# Patient Record
Sex: MALE | Race: WHITE | NOT HISPANIC OR LATINO | Employment: OTHER | URBAN - METROPOLITAN AREA
[De-identification: names, ages, dates, MRNs, and addresses within clinical notes are randomized per-mention and may not be internally consistent; named-entity substitution may affect disease eponyms.]

---

## 2018-10-29 ENCOUNTER — HOSPITAL ENCOUNTER (INPATIENT)
Facility: MEDICAL CENTER | Age: 68
LOS: 3 days | DRG: 247 | End: 2018-11-01
Attending: EMERGENCY MEDICINE | Admitting: HOSPITALIST
Payer: MEDICARE

## 2018-10-29 ENCOUNTER — APPOINTMENT (OUTPATIENT)
Dept: RADIOLOGY | Facility: MEDICAL CENTER | Age: 68
DRG: 247 | End: 2018-10-29
Attending: EMERGENCY MEDICINE
Payer: MEDICARE

## 2018-10-29 DIAGNOSIS — R79.89 ELEVATED TROPONIN: ICD-10-CM

## 2018-10-29 DIAGNOSIS — I21.4 NSTEMI (NON-ST ELEVATED MYOCARDIAL INFARCTION) (HCC): ICD-10-CM

## 2018-10-29 PROBLEM — E78.5 HLD (HYPERLIPIDEMIA): Status: ACTIVE | Noted: 2018-10-29

## 2018-10-29 PROBLEM — I10 HTN (HYPERTENSION): Status: ACTIVE | Noted: 2018-10-29

## 2018-10-29 LAB
APTT PPP: 64.7 SEC (ref 24.7–36)
INR PPP: 1.18 (ref 0.87–1.13)
PROTHROMBIN TIME: 15.1 SEC (ref 12–14.6)
TROPONIN I SERPL-MCNC: 35.92 NG/ML (ref 0–0.04)

## 2018-10-29 PROCEDURE — 700111 HCHG RX REV CODE 636 W/ 250 OVERRIDE (IP): Performed by: EMERGENCY MEDICINE

## 2018-10-29 PROCEDURE — 96365 THER/PROPH/DIAG IV INF INIT: CPT

## 2018-10-29 PROCEDURE — 99358 PROLONG SERVICE W/O CONTACT: CPT | Performed by: HOSPITALIST

## 2018-10-29 PROCEDURE — 85730 THROMBOPLASTIN TIME PARTIAL: CPT

## 2018-10-29 PROCEDURE — 84484 ASSAY OF TROPONIN QUANT: CPT

## 2018-10-29 PROCEDURE — 99223 1ST HOSP IP/OBS HIGH 75: CPT | Performed by: INTERNAL MEDICINE

## 2018-10-29 PROCEDURE — 700102 HCHG RX REV CODE 250 W/ 637 OVERRIDE(OP)

## 2018-10-29 PROCEDURE — 700102 HCHG RX REV CODE 250 W/ 637 OVERRIDE(OP): Performed by: HOSPITALIST

## 2018-10-29 PROCEDURE — 93005 ELECTROCARDIOGRAM TRACING: CPT | Performed by: EMERGENCY MEDICINE

## 2018-10-29 PROCEDURE — 700101 HCHG RX REV CODE 250: Performed by: INTERNAL MEDICINE

## 2018-10-29 PROCEDURE — 93005 ELECTROCARDIOGRAM TRACING: CPT | Performed by: HOSPITALIST

## 2018-10-29 PROCEDURE — A9270 NON-COVERED ITEM OR SERVICE: HCPCS | Performed by: INTERNAL MEDICINE

## 2018-10-29 PROCEDURE — A9270 NON-COVERED ITEM OR SERVICE: HCPCS | Performed by: HOSPITALIST

## 2018-10-29 PROCEDURE — 36415 COLL VENOUS BLD VENIPUNCTURE: CPT

## 2018-10-29 PROCEDURE — 71046 X-RAY EXAM CHEST 2 VIEWS: CPT

## 2018-10-29 PROCEDURE — 99291 CRITICAL CARE FIRST HOUR: CPT

## 2018-10-29 PROCEDURE — 93010 ELECTROCARDIOGRAM REPORT: CPT | Performed by: INTERNAL MEDICINE

## 2018-10-29 PROCEDURE — 93005 ELECTROCARDIOGRAM TRACING: CPT

## 2018-10-29 PROCEDURE — 99291 CRITICAL CARE FIRST HOUR: CPT | Performed by: HOSPITALIST

## 2018-10-29 PROCEDURE — A9270 NON-COVERED ITEM OR SERVICE: HCPCS

## 2018-10-29 PROCEDURE — 85610 PROTHROMBIN TIME: CPT

## 2018-10-29 PROCEDURE — 770022 HCHG ROOM/CARE - ICU (200)

## 2018-10-29 PROCEDURE — 700102 HCHG RX REV CODE 250 W/ 637 OVERRIDE(OP): Performed by: INTERNAL MEDICINE

## 2018-10-29 RX ORDER — NITROGLYCERIN 0.4 MG/1
0.4 TABLET SUBLINGUAL
Status: DISCONTINUED | OUTPATIENT
Start: 2018-10-29 | End: 2018-11-01 | Stop reason: HOSPADM

## 2018-10-29 RX ORDER — NITROGLYCERIN 0.4 MG/1
TABLET SUBLINGUAL
Status: COMPLETED
Start: 2018-10-29 | End: 2018-10-29

## 2018-10-29 RX ORDER — SODIUM CHLORIDE AND POTASSIUM CHLORIDE 150; 900 MG/100ML; MG/100ML
INJECTION, SOLUTION INTRAVENOUS CONTINUOUS
Status: DISCONTINUED | OUTPATIENT
Start: 2018-10-29 | End: 2018-11-01 | Stop reason: HOSPADM

## 2018-10-29 RX ORDER — ATORVASTATIN CALCIUM 20 MG/1
20 TABLET, FILM COATED ORAL DAILY
Status: ON HOLD | COMMUNITY
End: 2018-11-01

## 2018-10-29 RX ORDER — LOSARTAN POTASSIUM 50 MG/1
50 TABLET ORAL DAILY
COMMUNITY
End: 2018-12-10 | Stop reason: SDUPTHER

## 2018-10-29 RX ORDER — METOPROLOL TARTRATE 1 MG/ML
5 INJECTION, SOLUTION INTRAVENOUS
Status: DISCONTINUED | OUTPATIENT
Start: 2018-10-29 | End: 2018-11-01 | Stop reason: HOSPADM

## 2018-10-29 RX ORDER — OMEPRAZOLE 20 MG/1
20 CAPSULE, DELAYED RELEASE ORAL 2 TIMES DAILY
COMMUNITY
End: 2020-09-08

## 2018-10-29 RX ORDER — MORPHINE SULFATE 4 MG/ML
2-4 INJECTION, SOLUTION INTRAMUSCULAR; INTRAVENOUS
Status: DISCONTINUED | OUTPATIENT
Start: 2018-10-29 | End: 2018-11-01 | Stop reason: HOSPADM

## 2018-10-29 RX ORDER — HEPARIN SODIUM 1000 [USP'U]/ML
3800 INJECTION, SOLUTION INTRAVENOUS; SUBCUTANEOUS PRN
Status: DISCONTINUED | OUTPATIENT
Start: 2018-10-29 | End: 2018-10-29

## 2018-10-29 RX ORDER — ATORVASTATIN CALCIUM 80 MG/1
80 TABLET, FILM COATED ORAL EVERY EVENING
Status: DISCONTINUED | OUTPATIENT
Start: 2018-10-29 | End: 2018-11-01 | Stop reason: HOSPADM

## 2018-10-29 RX ORDER — LORAZEPAM 1 MG/1
1 TABLET ORAL 2 TIMES DAILY
COMMUNITY

## 2018-10-29 RX ORDER — OMEPRAZOLE 20 MG/1
20 CAPSULE, DELAYED RELEASE ORAL 2 TIMES DAILY
Status: DISCONTINUED | OUTPATIENT
Start: 2018-10-29 | End: 2018-11-01 | Stop reason: HOSPADM

## 2018-10-29 RX ORDER — LORAZEPAM 1 MG/1
1 TABLET ORAL 2 TIMES DAILY
Status: DISCONTINUED | OUTPATIENT
Start: 2018-10-29 | End: 2018-11-01 | Stop reason: HOSPADM

## 2018-10-29 RX ORDER — POLYETHYLENE GLYCOL 3350 17 G/17G
1 POWDER, FOR SOLUTION ORAL
Status: DISCONTINUED | OUTPATIENT
Start: 2018-10-29 | End: 2018-11-01 | Stop reason: HOSPADM

## 2018-10-29 RX ORDER — LOSARTAN POTASSIUM 25 MG/1
50 TABLET ORAL DAILY
Status: DISCONTINUED | OUTPATIENT
Start: 2018-10-30 | End: 2018-11-01 | Stop reason: HOSPADM

## 2018-10-29 RX ORDER — BISACODYL 10 MG
10 SUPPOSITORY, RECTAL RECTAL
Status: DISCONTINUED | OUTPATIENT
Start: 2018-10-29 | End: 2018-11-01 | Stop reason: HOSPADM

## 2018-10-29 RX ORDER — AMOXICILLIN 250 MG
2 CAPSULE ORAL 2 TIMES DAILY
Status: DISCONTINUED | OUTPATIENT
Start: 2018-10-29 | End: 2018-11-01 | Stop reason: HOSPADM

## 2018-10-29 RX ORDER — IBUPROFEN 200 MG
400 TABLET ORAL EVERY 6 HOURS PRN
Status: ON HOLD | COMMUNITY
End: 2018-11-01

## 2018-10-29 RX ORDER — ATORVASTATIN CALCIUM 20 MG/1
80 TABLET, FILM COATED ORAL DAILY
Status: DISCONTINUED | OUTPATIENT
Start: 2018-10-30 | End: 2018-10-29

## 2018-10-29 RX ORDER — DIPHENHYDRAMINE HCL 25 MG
50 TABLET ORAL
COMMUNITY
End: 2020-06-05

## 2018-10-29 RX ORDER — HEPARIN SODIUM 1000 [USP'U]/ML
3800 INJECTION, SOLUTION INTRAVENOUS; SUBCUTANEOUS PRN
Status: DISCONTINUED | OUTPATIENT
Start: 2018-10-29 | End: 2018-10-30

## 2018-10-29 RX ORDER — ACETAMINOPHEN 500 MG
2000 TABLET ORAL
COMMUNITY

## 2018-10-29 RX ADMIN — ATORVASTATIN CALCIUM 80 MG: 80 TABLET, FILM COATED ORAL at 22:55

## 2018-10-29 RX ADMIN — NITROGLYCERIN 0.4 MG: 0.4 TABLET SUBLINGUAL at 21:53

## 2018-10-29 RX ADMIN — NITROGLYCERIN 0.5 INCH: 20 OINTMENT TOPICAL at 22:55

## 2018-10-29 RX ADMIN — OMEPRAZOLE 20 MG: 20 CAPSULE, DELAYED RELEASE ORAL at 22:55

## 2018-10-29 RX ADMIN — HEPARIN SODIUM 1450 UNITS/HR: 5000 INJECTION, SOLUTION INTRAVENOUS at 20:38

## 2018-10-29 RX ADMIN — LORAZEPAM 1 MG: 1 TABLET ORAL at 22:56

## 2018-10-29 RX ADMIN — POTASSIUM CHLORIDE AND SODIUM CHLORIDE: 900; 150 INJECTION, SOLUTION INTRAVENOUS at 23:02

## 2018-10-29 ASSESSMENT — COGNITIVE AND FUNCTIONAL STATUS - GENERAL
SUGGESTED CMS G CODE MODIFIER DAILY ACTIVITY: CH
DAILY ACTIVITIY SCORE: 24
MOBILITY SCORE: 24
SUGGESTED CMS G CODE MODIFIER MOBILITY: CH

## 2018-10-29 ASSESSMENT — PATIENT HEALTH QUESTIONNAIRE - PHQ9
2. FEELING DOWN, DEPRESSED, IRRITABLE, OR HOPELESS: NOT AT ALL
SUM OF ALL RESPONSES TO PHQ9 QUESTIONS 1 AND 2: 0
1. LITTLE INTEREST OR PLEASURE IN DOING THINGS: NOT AT ALL

## 2018-10-29 ASSESSMENT — PAIN SCALES - GENERAL
PAINLEVEL_OUTOF10: 0
PAINLEVEL_OUTOF10: 2
PAINLEVEL_OUTOF10: 2
PAINLEVEL_OUTOF10: 0
PAINLEVEL_OUTOF10: 0

## 2018-10-29 ASSESSMENT — ENCOUNTER SYMPTOMS
NECK PAIN: 0
BACK PAIN: 0

## 2018-10-29 ASSESSMENT — LIFESTYLE VARIABLES
EVER_SMOKED: YES
ALCOHOL_USE: NO

## 2018-10-30 ENCOUNTER — APPOINTMENT (OUTPATIENT)
Dept: CARDIOLOGY | Facility: MEDICAL CENTER | Age: 68
DRG: 247 | End: 2018-10-30
Attending: HOSPITALIST
Payer: MEDICARE

## 2018-10-30 ENCOUNTER — PATIENT OUTREACH (OUTPATIENT)
Dept: HEALTH INFORMATION MANAGEMENT | Facility: OTHER | Age: 68
End: 2018-10-30

## 2018-10-30 LAB
ANION GAP SERPL CALC-SCNC: 7 MMOL/L (ref 0–11.9)
APTT PPP: 55.1 SEC (ref 24.7–36)
APTT PPP: 75.4 SEC (ref 24.7–36)
BASOPHILS # BLD AUTO: 0.2 % (ref 0–1.8)
BASOPHILS # BLD: 0.03 K/UL (ref 0–0.12)
BUN SERPL-MCNC: 16 MG/DL (ref 8–22)
CALCIUM SERPL-MCNC: 8.5 MG/DL (ref 8.5–10.5)
CHLORIDE SERPL-SCNC: 103 MMOL/L (ref 96–112)
CO2 SERPL-SCNC: 26 MMOL/L (ref 20–33)
CREAT SERPL-MCNC: 1.05 MG/DL (ref 0.5–1.4)
EKG IMPRESSION: NORMAL
EKG IMPRESSION: NORMAL
EOSINOPHIL # BLD AUTO: 0.03 K/UL (ref 0–0.51)
EOSINOPHIL NFR BLD: 0.2 % (ref 0–6.9)
ERYTHROCYTE [DISTWIDTH] IN BLOOD BY AUTOMATED COUNT: 47.3 FL (ref 35.9–50)
GLUCOSE SERPL-MCNC: 151 MG/DL (ref 65–99)
HCT VFR BLD AUTO: 42.3 % (ref 42–52)
HGB BLD-MCNC: 14.1 G/DL (ref 14–18)
IMM GRANULOCYTES # BLD AUTO: 0.07 K/UL (ref 0–0.11)
IMM GRANULOCYTES NFR BLD AUTO: 0.5 % (ref 0–0.9)
INR PPP: 2 (ref 0.87–1.13)
LV EJECT FRACT  99904: 45
LV EJECT FRACT MOD 4C 99902: 36.61
LYMPHOCYTES # BLD AUTO: 1.75 K/UL (ref 1–4.8)
LYMPHOCYTES NFR BLD: 12.3 % (ref 22–41)
MCH RBC QN AUTO: 31.5 PG (ref 27–33)
MCHC RBC AUTO-ENTMCNC: 33.3 G/DL (ref 33.7–35.3)
MCV RBC AUTO: 94.6 FL (ref 81.4–97.8)
MONOCYTES # BLD AUTO: 1.36 K/UL (ref 0–0.85)
MONOCYTES NFR BLD AUTO: 9.6 % (ref 0–13.4)
NEUTROPHILS # BLD AUTO: 11 K/UL (ref 1.82–7.42)
NEUTROPHILS NFR BLD: 77.2 % (ref 44–72)
NRBC # BLD AUTO: 0 K/UL
NRBC BLD-RTO: 0 /100 WBC
PLATELET # BLD AUTO: 183 K/UL (ref 164–446)
PMV BLD AUTO: 11.3 FL (ref 9–12.9)
POTASSIUM SERPL-SCNC: 4 MMOL/L (ref 3.6–5.5)
PROTHROMBIN TIME: 22.7 SEC (ref 12–14.6)
RBC # BLD AUTO: 4.47 M/UL (ref 4.7–6.1)
SODIUM SERPL-SCNC: 136 MMOL/L (ref 135–145)
TROPONIN I SERPL-MCNC: 31.14 NG/ML (ref 0–0.04)
TROPONIN I SERPL-MCNC: 33.77 NG/ML (ref 0–0.04)
TROPONIN I SERPL-MCNC: 38.42 NG/ML (ref 0–0.04)
TROPONIN I SERPL-MCNC: 43.14 NG/ML (ref 0–0.04)
TROPONIN I SERPL-MCNC: 50.98 NG/ML (ref 0–0.04)
WBC # BLD AUTO: 14.2 K/UL (ref 4.8–10.8)

## 2018-10-30 PROCEDURE — C9600 PERC DRUG-EL COR STENT SING: HCPCS | Mod: LD

## 2018-10-30 PROCEDURE — C1894 INTRO/SHEATH, NON-LASER: HCPCS

## 2018-10-30 PROCEDURE — B2151ZZ FLUOROSCOPY OF LEFT HEART USING LOW OSMOLAR CONTRAST: ICD-10-PCS | Performed by: INTERNAL MEDICINE

## 2018-10-30 PROCEDURE — C1769 GUIDE WIRE: HCPCS

## 2018-10-30 PROCEDURE — 92928 PRQ TCAT PLMT NTRAC ST 1 LES: CPT | Mod: 59,LD | Performed by: INTERNAL MEDICINE

## 2018-10-30 PROCEDURE — C1725 CATH, TRANSLUMIN NON-LASER: HCPCS

## 2018-10-30 PROCEDURE — 85610 PROTHROMBIN TIME: CPT

## 2018-10-30 PROCEDURE — 770022 HCHG ROOM/CARE - ICU (200)

## 2018-10-30 PROCEDURE — 93010 ELECTROCARDIOGRAM REPORT: CPT | Performed by: INTERNAL MEDICINE

## 2018-10-30 PROCEDURE — 700111 HCHG RX REV CODE 636 W/ 250 OVERRIDE (IP): Performed by: HOSPITALIST

## 2018-10-30 PROCEDURE — 51798 US URINE CAPACITY MEASURE: CPT

## 2018-10-30 PROCEDURE — 360979 HCHG DIAGNOSTIC CATH

## 2018-10-30 PROCEDURE — 700105 HCHG RX REV CODE 258: Performed by: INTERNAL MEDICINE

## 2018-10-30 PROCEDURE — 84484 ASSAY OF TROPONIN QUANT: CPT | Mod: 91

## 2018-10-30 PROCEDURE — 93325 DOPPLER ECHO COLOR FLOW MAPG: CPT

## 2018-10-30 PROCEDURE — 90471 IMMUNIZATION ADMIN: CPT

## 2018-10-30 PROCEDURE — 99232 SBSQ HOSP IP/OBS MODERATE 35: CPT | Performed by: HOSPITALIST

## 2018-10-30 PROCEDURE — B2111ZZ FLUOROSCOPY OF MULTIPLE CORONARY ARTERIES USING LOW OSMOLAR CONTRAST: ICD-10-PCS | Performed by: INTERNAL MEDICINE

## 2018-10-30 PROCEDURE — 99152 MOD SED SAME PHYS/QHP 5/>YRS: CPT

## 2018-10-30 PROCEDURE — 93005 ELECTROCARDIOGRAM TRACING: CPT | Performed by: INTERNAL MEDICINE

## 2018-10-30 PROCEDURE — 93458 L HRT ARTERY/VENTRICLE ANGIO: CPT | Mod: 26,59 | Performed by: INTERNAL MEDICINE

## 2018-10-30 PROCEDURE — C1874 STENT, COATED/COV W/DEL SYS: HCPCS

## 2018-10-30 PROCEDURE — 700102 HCHG RX REV CODE 250 W/ 637 OVERRIDE(OP)

## 2018-10-30 PROCEDURE — 93308 TTE F-UP OR LMTD: CPT | Mod: 26 | Performed by: INTERNAL MEDICINE

## 2018-10-30 PROCEDURE — C9600 PERC DRUG-EL COR STENT SING: HCPCS | Mod: RC

## 2018-10-30 PROCEDURE — A9270 NON-COVERED ITEM OR SERVICE: HCPCS | Performed by: INTERNAL MEDICINE

## 2018-10-30 PROCEDURE — 700102 HCHG RX REV CODE 250 W/ 637 OVERRIDE(OP): Performed by: INTERNAL MEDICINE

## 2018-10-30 PROCEDURE — 99232 SBSQ HOSP IP/OBS MODERATE 35: CPT | Performed by: INTERNAL MEDICINE

## 2018-10-30 PROCEDURE — 700111 HCHG RX REV CODE 636 W/ 250 OVERRIDE (IP)

## 2018-10-30 PROCEDURE — 700101 HCHG RX REV CODE 250

## 2018-10-30 PROCEDURE — 99152 MOD SED SAME PHYS/QHP 5/>YRS: CPT | Performed by: INTERNAL MEDICINE

## 2018-10-30 PROCEDURE — 99153 MOD SED SAME PHYS/QHP EA: CPT

## 2018-10-30 PROCEDURE — 307093 HCHG TR BAND RADIAL

## 2018-10-30 PROCEDURE — 93458 L HRT ARTERY/VENTRICLE ANGIO: CPT

## 2018-10-30 PROCEDURE — 85730 THROMBOPLASTIN TIME PARTIAL: CPT | Mod: 91

## 2018-10-30 PROCEDURE — 700101 HCHG RX REV CODE 250: Performed by: INTERNAL MEDICINE

## 2018-10-30 PROCEDURE — 80048 BASIC METABOLIC PNL TOTAL CA: CPT

## 2018-10-30 PROCEDURE — 700102 HCHG RX REV CODE 250 W/ 637 OVERRIDE(OP): Performed by: HOSPITALIST

## 2018-10-30 PROCEDURE — A9270 NON-COVERED ITEM OR SERVICE: HCPCS

## 2018-10-30 PROCEDURE — C1887 CATHETER, GUIDING: HCPCS

## 2018-10-30 PROCEDURE — A9270 NON-COVERED ITEM OR SERVICE: HCPCS | Performed by: HOSPITALIST

## 2018-10-30 PROCEDURE — 700111 HCHG RX REV CODE 636 W/ 250 OVERRIDE (IP): Performed by: INTERNAL MEDICINE

## 2018-10-30 PROCEDURE — 304952 HCHG R 2 PADS

## 2018-10-30 PROCEDURE — 3E0234Z INTRODUCTION OF SERUM, TOXOID AND VACCINE INTO MUSCLE, PERCUTANEOUS APPROACH: ICD-10-PCS | Performed by: INTERNAL MEDICINE

## 2018-10-30 PROCEDURE — 85025 COMPLETE CBC W/AUTO DIFF WBC: CPT

## 2018-10-30 PROCEDURE — 4A023N7 MEASUREMENT OF CARDIAC SAMPLING AND PRESSURE, LEFT HEART, PERCUTANEOUS APPROACH: ICD-10-PCS | Performed by: INTERNAL MEDICINE

## 2018-10-30 PROCEDURE — 027135Z DILATION OF CORONARY ARTERY, TWO ARTERIES WITH TWO DRUG-ELUTING INTRALUMINAL DEVICES, PERCUTANEOUS APPROACH: ICD-10-PCS | Performed by: INTERNAL MEDICINE

## 2018-10-30 PROCEDURE — 90670 PCV13 VACCINE IM: CPT | Performed by: HOSPITALIST

## 2018-10-30 RX ORDER — SODIUM CHLORIDE 9 MG/ML
INJECTION, SOLUTION INTRAVENOUS
Status: ACTIVE
Start: 2018-10-30 | End: 2018-10-30

## 2018-10-30 RX ORDER — LIDOCAINE HYDROCHLORIDE 20 MG/ML
INJECTION, SOLUTION INFILTRATION; PERINEURAL
Status: COMPLETED
Start: 2018-10-30 | End: 2018-10-30

## 2018-10-30 RX ORDER — VERAPAMIL HYDROCHLORIDE 2.5 MG/ML
INJECTION, SOLUTION INTRAVENOUS
Status: COMPLETED
Start: 2018-10-30 | End: 2018-10-30

## 2018-10-30 RX ORDER — MIDAZOLAM HYDROCHLORIDE 1 MG/ML
INJECTION INTRAMUSCULAR; INTRAVENOUS
Status: COMPLETED
Start: 2018-10-30 | End: 2018-10-30

## 2018-10-30 RX ORDER — BIVALIRUDIN 250 MG/5ML
INJECTION, POWDER, LYOPHILIZED, FOR SOLUTION INTRAVENOUS
Status: COMPLETED
Start: 2018-10-30 | End: 2018-10-30

## 2018-10-30 RX ORDER — HEPARIN SODIUM,PORCINE 1000/ML
VIAL (ML) INJECTION
Status: COMPLETED
Start: 2018-10-30 | End: 2018-10-30

## 2018-10-30 RX ORDER — SODIUM CHLORIDE 9 MG/ML
INJECTION, SOLUTION INTRAVENOUS CONTINUOUS
Status: ACTIVE | OUTPATIENT
Start: 2018-10-30 | End: 2018-10-30

## 2018-10-30 RX ORDER — ONDANSETRON 2 MG/ML
4 INJECTION INTRAMUSCULAR; INTRAVENOUS EVERY 4 HOURS PRN
Status: DISCONTINUED | OUTPATIENT
Start: 2018-10-30 | End: 2018-11-01 | Stop reason: HOSPADM

## 2018-10-30 RX ORDER — ACETAMINOPHEN 325 MG/1
650 TABLET ORAL EVERY 6 HOURS PRN
Status: DISCONTINUED | OUTPATIENT
Start: 2018-10-30 | End: 2018-11-01 | Stop reason: HOSPADM

## 2018-10-30 RX ADMIN — MORPHINE SULFATE 4 MG: 4 INJECTION INTRAVENOUS at 00:01

## 2018-10-30 RX ADMIN — HEPARIN SODIUM: 1000 INJECTION, SOLUTION INTRAVENOUS; SUBCUTANEOUS at 08:09

## 2018-10-30 RX ADMIN — POTASSIUM CHLORIDE AND SODIUM CHLORIDE: 900; 150 INJECTION, SOLUTION INTRAVENOUS at 17:21

## 2018-10-30 RX ADMIN — NITROGLYCERIN 0.5 INCH: 20 OINTMENT TOPICAL at 18:00

## 2018-10-30 RX ADMIN — MIDAZOLAM HYDROCHLORIDE 1.5 MG: 1 INJECTION, SOLUTION INTRAMUSCULAR; INTRAVENOUS at 09:14

## 2018-10-30 RX ADMIN — NITROGLYCERIN 0.5 INCH: 20 OINTMENT TOPICAL at 12:04

## 2018-10-30 RX ADMIN — OMEPRAZOLE 20 MG: 20 CAPSULE, DELAYED RELEASE ORAL at 20:47

## 2018-10-30 RX ADMIN — BIVALIRUDIN 250 MG: 250 INJECTION, POWDER, LYOPHILIZED, FOR SOLUTION INTRAVENOUS at 09:23

## 2018-10-30 RX ADMIN — ONDANSETRON 4 MG: 2 INJECTION, SOLUTION INTRAMUSCULAR; INTRAVENOUS at 13:28

## 2018-10-30 RX ADMIN — VERAPAMIL HYDROCHLORIDE 2.5 MG: 2.5 INJECTION, SOLUTION INTRAVENOUS at 08:10

## 2018-10-30 RX ADMIN — NITROGLYCERIN 10 ML: 20 INJECTION INTRAVENOUS at 08:10

## 2018-10-30 RX ADMIN — Medication 2 TABLET: at 18:00

## 2018-10-30 RX ADMIN — TICAGRELOR 180 MG: 90 TABLET ORAL at 09:24

## 2018-10-30 RX ADMIN — OMEPRAZOLE 20 MG: 20 CAPSULE, DELAYED RELEASE ORAL at 10:06

## 2018-10-30 RX ADMIN — NITROGLYCERIN 0.5 INCH: 20 OINTMENT TOPICAL at 05:28

## 2018-10-30 RX ADMIN — LORAZEPAM 1 MG: 1 TABLET ORAL at 18:00

## 2018-10-30 RX ADMIN — ASPIRIN 81 MG: 81 TABLET, COATED ORAL at 05:28

## 2018-10-30 RX ADMIN — PNEUMOCOCCAL 13-VALENT CONJUGATE VACCINE 0.5 ML: 2.2; 2.2; 2.2; 2.2; 2.2; 4.4; 2.2; 2.2; 2.2; 2.2; 2.2; 2.2; 2.2 INJECTION, SUSPENSION INTRAMUSCULAR at 05:28

## 2018-10-30 RX ADMIN — FENTANYL CITRATE 75 MCG: 50 INJECTION, SOLUTION INTRAMUSCULAR; INTRAVENOUS at 09:14

## 2018-10-30 RX ADMIN — ACETAMINOPHEN 650 MG: 325 TABLET, FILM COATED ORAL at 18:12

## 2018-10-30 RX ADMIN — SODIUM CHLORIDE: 9 INJECTION, SOLUTION INTRAVENOUS at 10:07

## 2018-10-30 RX ADMIN — LORAZEPAM 1 MG: 1 TABLET ORAL at 05:28

## 2018-10-30 RX ADMIN — LIDOCAINE HYDROCHLORIDE: 20 INJECTION, SOLUTION INFILTRATION; PERINEURAL at 08:10

## 2018-10-30 RX ADMIN — ATORVASTATIN CALCIUM 80 MG: 80 TABLET, FILM COATED ORAL at 18:00

## 2018-10-30 RX ADMIN — BIVALIRUDIN 250 MG: 250 INJECTION, POWDER, LYOPHILIZED, FOR SOLUTION INTRAVENOUS at 08:25

## 2018-10-30 RX ADMIN — BIVALIRUDIN 0.2 MG/KG/HR: 250 INJECTION, POWDER, LYOPHILIZED, FOR SOLUTION INTRAVENOUS at 10:30

## 2018-10-30 RX ADMIN — POTASSIUM CHLORIDE AND SODIUM CHLORIDE: 900; 150 INJECTION, SOLUTION INTRAVENOUS at 05:27

## 2018-10-30 RX ADMIN — HEPARIN SODIUM 2000 UNITS: 1000 INJECTION, SOLUTION INTRAVENOUS; SUBCUTANEOUS at 08:11

## 2018-10-30 ASSESSMENT — ENCOUNTER SYMPTOMS
LOSS OF CONSCIOUSNESS: 0
DIARRHEA: 0
NECK PAIN: 0
DIAPHORESIS: 1
NAUSEA: 1
CHILLS: 0
STRIDOR: 0
WEAKNESS: 0
BLOOD IN STOOL: 0
NERVOUS/ANXIOUS: 0
ORTHOPNEA: 0
TINGLING: 0
COUGH: 0
WHEEZING: 0
SEIZURES: 0
SHORTNESS OF BREATH: 0
FLANK PAIN: 0
SPEECH CHANGE: 0
SORE THROAT: 0
BACK PAIN: 0
FALLS: 0
DIZZINESS: 0
EYE DISCHARGE: 0
HEADACHES: 0
ABDOMINAL PAIN: 0
PHOTOPHOBIA: 0
MEMORY LOSS: 0
BLURRED VISION: 0
VOMITING: 0
DEPRESSION: 0
EYE REDNESS: 0
SPUTUM PRODUCTION: 0
EYE PAIN: 0
PALPITATIONS: 0
HEMOPTYSIS: 0
NAUSEA: 0
FOCAL WEAKNESS: 0
MYALGIAS: 0
FEVER: 0
HALLUCINATIONS: 0

## 2018-10-30 ASSESSMENT — PAIN SCALES - GENERAL
PAINLEVEL_OUTOF10: 0
PAINLEVEL_OUTOF10: 3
PAINLEVEL_OUTOF10: 2
PAINLEVEL_OUTOF10: 0

## 2018-10-30 ASSESSMENT — LIFESTYLE VARIABLES
SUBSTANCE_ABUSE: 0
EVER_SMOKED: NEVER

## 2018-10-30 NOTE — PROGRESS NOTES
[facility] Hospitalist Progress Note    Date of Service: 10/30/2018    Chief Complaint  68 y.o. male admitted 10/29/2018 with NSTEMI    Interval Problem Update  On angiomax  NS  Straight cath overnight x1     Consultants/Specialty  Cardiology  CC    Disposition  TBD        Review of Systems   Constitutional: Negative for fever and malaise/fatigue.   HENT: Negative for congestion, ear discharge and nosebleeds.    Eyes: Negative for blurred vision, pain, discharge and redness.   Respiratory: Negative for cough, hemoptysis, sputum production, shortness of breath and stridor.    Cardiovascular: Negative for chest pain, palpitations, orthopnea and leg swelling.   Gastrointestinal: Negative for abdominal pain, blood in stool, diarrhea, melena, nausea and vomiting.   Genitourinary: Negative for dysuria, flank pain and hematuria.   Musculoskeletal: Negative for back pain, falls, joint pain and neck pain.   Skin: Negative.    Neurological: Negative for speech change, focal weakness, seizures, loss of consciousness, weakness and headaches.   Psychiatric/Behavioral: Negative for hallucinations, memory loss and substance abuse. The patient is not nervous/anxious.    All other systems reviewed and are negative.     Physical Exam  Laboratory/Imaging   Hemodynamics  Temp (24hrs), Av.7 °C (98.1 °F), Min:36.2 °C (97.2 °F), Max:37.1 °C (98.7 °F)   Temperature: 36.2 °C (97.2 °F)  Pulse  Av.2  Min: 56  Max: 84 Heart Rate (Monitored): (!) 58  Blood Pressure : 109/65, NIBP: 102/61      Respiratory      Respiration: (!) 21, Pulse Oximetry: 100 %             Fluids    Intake/Output Summary (Last 24 hours) at 10/30/18 1042  Last data filed at 10/30/18 1000   Gross per 24 hour   Intake          2066.04 ml   Output              350 ml   Net          1716.04 ml       Nutrition  Orders Placed This Encounter   Procedures   • Diet Order Cardiac     Standing Status:   Standing     Number of Occurrences:   1     Order Specific Question:    Diet:     Answer:   Cardiac [6]     Physical Exam   Constitutional: He is oriented to person, place, and time. He appears well-developed and well-nourished.   HENT:   Head: Normocephalic and atraumatic.   Right Ear: External ear normal.   Left Ear: External ear normal.   Mouth/Throat: No oropharyngeal exudate.   Eyes: Pupils are equal, round, and reactive to light. Conjunctivae are normal. Right eye exhibits no discharge. Left eye exhibits no discharge.   Neck: Neck supple. No JVD present. No tracheal deviation present.   Cardiovascular: Normal rate and regular rhythm.  Exam reveals no gallop and no friction rub.    No murmur heard.  Pulmonary/Chest: Effort normal and breath sounds normal. No respiratory distress. He has no wheezes. He exhibits no tenderness.   Abdominal: Soft. Bowel sounds are normal. He exhibits no distension. There is no tenderness. There is no rebound.   Musculoskeletal: He exhibits no edema or tenderness.   Neurological: He is oriented to person, place, and time. No cranial nerve deficit. He exhibits normal muscle tone.   Asleep arousable   Skin: Skin is warm and dry. He is not diaphoretic. No cyanosis or erythema. Nails show no clubbing.   Psychiatric: He has a normal mood and affect.   Nursing note and vitals reviewed.      Recent Labs      10/30/18   0527   WBC  14.2*   RBC  4.47*   HEMOGLOBIN  14.1   HEMATOCRIT  42.3   MCV  94.6   MCH  31.5   MCHC  33.3*   RDW  47.3   PLATELETCT  183   MPV  11.3     Recent Labs      10/30/18   0527   SODIUM  136   POTASSIUM  4.0   CHLORIDE  103   CO2  26   GLUCOSE  151*   BUN  16   CREATININE  1.05   CALCIUM  8.5     Recent Labs      10/29/18   1923  10/30/18   0249   APTT  64.7*  55.1*   INR  1.18*   --                   Assessment/Plan     * NSTEMI (non-ST elevated myocardial infarction) ([facility])   Assessment & Plan    S/p PCI to mid RCA and mid LAD    Continue aspirin Brilinta atorvastatin  Continue losartan  Given bradycardia hold off on  beta-blocker  Cardiology following  Continue telemetry monitoring  Echo EF 45%        HLD (hyperlipidemia)   Assessment & Plan    High-dose atorvastatin        HTN (hypertension)   Assessment & Plan    Stable on losartan          Quality-Core Measures      Plan of care reviewed with patient and family at bedside and discussed with nursing staff

## 2018-10-30 NOTE — H&P
Hospital Medicine History & Physical Note    Date of Service  10/29/2018    Primary Care Physician  No primary care provider on file.    Consultants  Dr. Lion, cardiology    Code Status  Full    Chief Complaint  Chief Complaint   Patient presents with   • Chest Pain     x 3 days        History of Presenting Illness  68 y.o. male who presented on 10/29/2018 in transfer from outside facility for NSTEMI.  The patient states that his symptoms began 3 days ago.  At that time, he had been unloading a truck full of lawnmowers and initially thought that he had developed a muscle strain.  Since then, he has had a dull, centralized chest pain which radiates to his ears and right arm and is associated with dizziness as well as diaphoresis.  He also had difficulty sleeping.  Today, as his pain persisted, he called his family physician who referred him to the emergency room.  His family history is positive for CVA in his mother and paternal grandfather in his 50s.  He otherwise has had no recent fevers, chills, nausea, vomiting, headache, URIs, diarrhea or dysuria.    At the outside facility, workup including WBC 15.6, hemoglobin 16.6 hematocrit 48.7 platelets 231 sodium 137 potassium 40 chloride 99 CO2 26 BUN 13 creatinine 0.8 glucose 124 troponin 15.9.  Chest x-ray showed mild cardiomegaly otherwise unchanged since to the he was transferred to our facility for higher level of care and specialist consultation.    Review of Systems  Review of Systems   Constitutional: Negative for chills and fever.   HENT: Negative for congestion and sore throat.    Eyes: Negative for photophobia.   Respiratory: Negative for cough, shortness of breath and wheezing.    Cardiovascular: Negative for chest pain and palpitations.   Gastrointestinal: Negative for abdominal pain, diarrhea, nausea and vomiting.   Genitourinary: Negative for dysuria.   Musculoskeletal: Negative for myalgias.   Skin: Negative.    Neurological: Negative for  dizziness, tingling, focal weakness and headaches.   Psychiatric/Behavioral: Negative for depression and suicidal ideas.       Past Medical History  Past Medical History:   Diagnosis Date   • GERD (gastroesophageal reflux disease)    • Hyperlipidemia    • Hypertension    • Hypothyroidism        Surgical History  Patient denies    Family History  CVAs in mother and paternal grandfather    Social History  Social History   Substance Use Topics   • Smoking status: Former Smoker   • Smokeless tobacco: Not on file   • Alcohol use No       Allergies  No Known Allergies    Medications  No current facility-administered medications on file prior to encounter.      No current outpatient prescriptions on file prior to encounter.       Physical Exam  Hemodynamics  Temp (24hrs), Av.5 °C (97.7 °F), Min:36.5 °C (97.7 °F), Max:36.5 °C (97.7 °F)   Temperature: 36.5 °C (97.7 °F)  Pulse  Av.6  Min: 59  Max: 81 Heart Rate (Monitored): 64  Blood Pressure : 109/65, NIBP: 105/50      Respiratory      Respiration: 18, Pulse Oximetry: 97 %             Physical Exam   Constitutional: He is oriented to person, place, and time. No distress.   HENT:   Head: Normocephalic and atraumatic.   Right Ear: External ear normal.   Left Ear: External ear normal.   Eyes: EOM are normal. Right eye exhibits no discharge. Left eye exhibits no discharge.   Neck: Neck supple. No JVD present.   Cardiovascular: Normal rate, regular rhythm and normal heart sounds.    Pulmonary/Chest: Effort normal and breath sounds normal. No respiratory distress. He exhibits no tenderness.   Abdominal: Soft. Bowel sounds are normal. He exhibits no distension. There is no tenderness.   Musculoskeletal: He exhibits no edema.   Neurological: He is alert and oriented to person, place, and time. No cranial nerve deficit.   Skin: Skin is dry. He is not diaphoretic. No erythema.   Psychiatric: He has a normal mood and affect. His behavior is normal.   Nursing note and vitals  reviewed.    Capillary refill less than 3 seconds, distal pulses intact    Laboratory:          No results for input(s): ALTSGPT, ASTSGOT, ALKPHOSPHAT, TBILIRUBIN, DBILIRUBIN, GAMMAGT, AMYLASE, LIPASE, ALB, PREALBUMIN, GLUCOSE in the last 72 hours.  Recent Labs      10/29/18   1923   APTT  64.7*   INR  1.18*             Lab Results   Component Value Date    TROPONINI 35.92 (H) 10/29/2018       Imaging  Dx-chest-2 Views    Result Date: 10/29/2018  10/29/2018 7:58 PM HISTORY/REASON FOR EXAM:  Chest Pain Chest Pain x3 days. Pt reports Saturday pain radiates across back of shoulders after lifting his . Pt reports felt that he had a muscle strain. Pain radiating down arms TECHNIQUE/EXAM DESCRIPTION AND NUMBER OF VIEWS: Two views of the chest. COMPARISON:  None. FINDINGS: Minimal bibasilar opacities. No pleural effusions, no pneumothorax are appreciated. Normal cardiopericardial silhouette.     Minimal bibasilar opacities, likely atelectasis.        Assessment/Plan:  Anticipate that patient will need greater than 2 midnights for management of the discussed medical issues.    * NSTEMI (non-ST elevated myocardial infarction) (HCC)   Assessment & Plan    Patient is currently chest pain-free.  However, he does have evolving troponin levels therefore he will be admitted to the cardiac intensive care unit for continuous hemodynamic monitoring.  I have started him on an heparin drip, I will also increase his home statin to higher intensity and continue his home aspirin.  He will receive as needed morphine, oxygen, and nitroglycerin.  I will check a TSH and a lipid panel as well as an echocardiogram.  If his blood pressure tolerates, we will consider adding a beta-blocker to his ARB.  He will be kept n.p.o. at midnight for cardiac catheterization in the morning. Dr. Lion of cardiology was consulted by the emergency room physician and I appreciate his recommendations.        HLD (hyperlipidemia)   Assessment & Plan     Treatment as noted above for NSTEMI, increasing home Lipitor dose to higher intensity and continuing aspirin        HTN (hypertension)   Assessment & Plan    Currently well controlled, continue home Cozaar.            Prophylaxis: Patient will be on heparin, no additional need for DVT prophylaxis, home PPI indicated, bowel protocol as needed    I spent a total of 35 minutes of critical care time during this clinical encounter of which > 50% was devoted to counseling where able and coordinating care including review of records, pertinent lab data and studies, as well as discussing diagnostic evaluation and work up, planned therapeutic interventions and future disposition of care. Where indicated, the assessment and plan reflect discussion of patient with consultants, other healthcare providers, family members, and additional research needed to obtain further information in formulating the plan of care of this patient. This time includes no procedures or overlap with other providers.    I spent a total of 35 minutes of non face to face time performing additional research, reviewing medical records from transferring facility, discussing plan of care with other healthcare providers. Start time: 7:55PM. End time: 8:30PM.

## 2018-10-30 NOTE — ED NOTES
Med Rec complete per Pt at bedside and Medication list (returned)   Allergies Reviewed  No ABX in the last 30 days    Pt reports taking 2000 mg of Tylenol at bedtime when needed.

## 2018-10-30 NOTE — ED PROVIDER NOTES
ED Provider Note    Scribed for Venkata Kurtz II, M.D. by Dede Marie. 10/29/2018  7:15 PM    Means of Arrival: Ambulance  History obtained by: Patient  Limitations: None    CHIEF COMPLAINT  Chief Complaint   Patient presents with   • Chest Pain     x 3 days        HPI   Lv Mast is a 68 y.o. male with history of hypertension who presents to the Emergency Department as a transfer from [place] for evaluation of intermittent chest pain that radiates to his bilateral shoulders onset 3 days ago. He reports the pain began after lifting a  and states it feels like he pulled a muscle. Lv additionally endorses associated diaphoresis and lightheadedness. He states Advil or low dose Aspirin the past few days has provided no alleviation. Lv discussed his symptoms with his PCP and was recommended to go to the ED today. Upon evaluation at the [place] ED, he was found to have an elevated troponin of 15. He was placed on a Heparin drip and Nitroglycerin drip, which has helped relieve his pain. He also received a full strength aspirin. Lv reports he does not currently have any cardiac stents in place. He denies any neck pain, back pain, or lower extremity edema at this time. Lv was transferred here for a higher level of care.         REVIEW OF SYSTEMS  Review of Systems   Constitutional: Positive for diaphoresis. Negative for fever.   HENT: Negative for congestion and sore throat.    Respiratory: Negative for cough, shortness of breath and wheezing.    Cardiovascular: Positive for chest pain. Negative for leg swelling.   Gastrointestinal: Positive for nausea. Negative for abdominal pain and vomiting.   Musculoskeletal: Negative for back pain and neck pain.        Positive for bilateral shoulder pain.    Skin:        Positive for diaphoresis.   Neurological: Negative for tingling and headaches.        Positive for lightheadedness.    All other systems reviewed and are negative.    See HPI for further  "details.    PAST MEDICAL HISTORY   has a past medical history of Anxiety; GERD (gastroesophageal reflux disease); Hyperlipidemia; Hypertension; and Hypothyroidism.    SOCIAL HISTORY  Social History     Social History Main Topics   • Smoking status: Former Smoker   • Smokeless tobacco: None noted   • Alcohol use No   • Drug use: No   • Sexual activity: None noted       SURGICAL HISTORY   has a past surgical history that includes cataract extraction with iol and arthroscopy, knee (Left, 06/1992).    CURRENT MEDICATIONS  Home Medications     Reviewed by Hanna Miguel (Pharmacy Tech) on 10/29/18 at 2023  Med List Status: Complete   Medication Last Dose Status   acetaminophen (TYLENOL) 500 MG Tab 10/27/2018 Active   aspirin EC (ECOTRIN) 81 MG Tablet Delayed Response 10/29/2018 Active   atorvastatin (LIPITOR) 20 MG Tab 10/29/2018 Active   diphenhydrAMINE (BENADRYL) 25 MG Tab 10/27/2018 Active   ibuprofen (MOTRIN) 200 MG Tab 10/29/2018 Active   LORazepam (ATIVAN) 1 MG Tab 10/29/2018 Active   losartan (COZAAR) 50 MG Tab 10/29/2018 Active   omeprazole (PRILOSEC) 20 MG delayed-release capsule 10/29/2018 Active                ALLERGIES  No Known Allergies    PHYSICAL EXAM  VITAL SIGNS: /65   Pulse 64   Temp 36.5 °C (97.7 °F)   Resp 18   Ht 1.854 m (6' 1\")   Wt 99.8 kg (220 lb)   SpO2 99%   BMI 29.03 kg/m²     Pulse ox interpretation: I interpret this pulse ox as normal.  Constitutional: Alert in no apparent distress. Pleasant 68 year old man.   HENT: No signs of trauma, Bilateral external ears normal, Nose normal.   Eyes: Pupils are equal, Conjunctiva normal, Non-icteric.   Neck: Normal range of motion, No tenderness, Supple, No stridor. No JVD.  Cardiovascular: Regular rate and rhythm, no murmurs. No cyanosis of extremities. Equal radial pulses. No peripheral edema of extremities.  Thorax & Lungs: Normal breath sounds, No respiratory distress, No wheezing, No chest tenderness.   Abdomen: Bowel sounds " normal, Soft, No tenderness, No masses, No pulsatile masses. No peritoneal signs.  Skin: Warm, Dry, No erythema, No rash.   Back: No midline bony tenderness, No CVA tenderness.   Musculoskeletal: Good range of motion in all major joints. No tenderness to palpation or major deformities noted.   Neurologic: Alert , Normal motor function, Normal sensory function, No focal deficits noted.   Psychiatric: Affect normal, Judgment normal, Mood normal.       DIAGNOSTIC STUDIES / PROCEDURES    EKG Interpretation:  Interpreted by me  Rhythm:  Normal sinus rhythm   Rate: 64  Intervals: normal  No ST elevations or depressions  T wave inversions in inferior leads and flattening in lateral leads  Clinical Impression: No significant change from outside hospital    LABS  Results for orders placed or performed during the hospital encounter of 10/29/18   TROPONIN   Result Value Ref Range    Troponin I 35.92 (H) 0.00 - 0.04 ng/mL   PT/INR   Result Value Ref Range    PT 15.1 (H) 12.0 - 14.6 sec    INR 1.18 (H) 0.87 - 1.13   PTT   Result Value Ref Range    APTT 64.7 (H) 24.7 - 36.0 sec   EKG   Result Value Ref Range    Report       [facility] Emergency Dept.    Test Date:  2018-10-29  Pt Name:    DEE GOSS                  Department: ER  MRN:        [id number]                      Room:        07  Gender:     Male                         Technician: [id number]  :        1950                   Requested By:FLAQUITO KENT II  Order #:    [id number]                    Reading MD:    Measurements  Intervals                                Axis  Rate:       64                           P:          60  VT:         152                          QRS:        8  QRSD:       88                           T:          -10  QT:         392  QTc:        405    Interpretive Statements  SINUS RHYTHM  PROBABLE LEFT ATRIAL ABNORMALITY  PROBABLE INFERIOR INFARCT, AGE INDETERMINATE  CONSIDER POSTERIOR WALL INVOLVEMENT  No previous  ECG available for comparison     EKG   Result Value Ref Range    Report       [facility]    Test Date:  2018-10-29  Pt Name:    DEE GOSS                  Department: 161  MRN:        [id number]                      Room:       T634  Gender:     Male                         Technician: [email or web address]  :        1950                   Requested By:ARMEN GALLEGOS  Order #:    [id number]                    Reading MD:    Measurements  Intervals                                Axis  Rate:       62                           P:          49  WI:         152                          QRS:        -10  QRSD:       90                           T:          -17  QT:         408  QTc:        415    Interpretive Statements  SINUS RHYTHM  PROBABLE INFERIOR INFARCT, AGE INDETERMINATE  PROBABLE POSTERIOR INFARCT  Compared to ECG 10/29/2018 19:28:55  No significant changes       Pertinent Labs & Imaging studies reviewed. (See chart for details)    RADIOLOGY  DX-CHEST-2 VIEWS   Final Result         Minimal bibasilar opacities, likely atelectasis.      EC-ECHOCARDIOGRAM COMPLETE W/O CONT    (Results Pending)     Pertinent Labs & Imaging studies reviewed. (See chart for details)    COURSE & MEDICAL DECISION MAKING  Pertinent Labs & Imaging studies reviewed. (See chart for details)    7:15 PM This is a 68 y.o. male who presents with chest pain that radiates to his bilateral shoulders. The differential diagnosis includes but is not limited to NSTEMI, no ST elevation MI, unstable angina. Low suspicion for dissection, but will reevaluate with 2 view chest x-ray and check both upper extremities blood pressures. Ordered for DX-Chest, Troponin, PT/INR, PTT, and EKG to evaluate. EKG similar to EKG done earlier today. No STEMI.     8:14 PM - Patient reevaluated at bedside. He is resting comfortably with stable vital signs. Patient reports he is pain free at this time. Upper extremity blood pressures equal. CXR without mediastinal widening. He was  updated with lab results that indicates an uptrending Troponin of 35.92. Patient informed I will be speaking with Cardiology to determine if he will be taken to Cath lab tonight. He is understanding and agreeable.    8:16 PM - Paged Cardiology.     8:23 PM - Consult with Dr. Reyes, Hospitalist, who agrees to admit the patient.    8:30 PM - Consult with Dr. Quiñonez, Cardiologist, who will come and see the patient.  Most likely angiogarphy/PCI in the morning unless he develops recurrent chest pain or STEMI.    CRITICAL CARE  The very real possibilty of a deterioration of this patient's condition required the highest level of my preparedness for sudden, emergent intervention.  I provided critical care services, which included medication orders, frequent reevaluations of the patient's condition and response to treatment, ordering and reviewing test results, and discussing the case with various consultants.  The critical care time associated with the care of the patient was 32 minutes. Review chart for interventions. This time is exclusive of any other billable procedures.     DISPOSITION:  [facility] will be [facility] in guarded condition.    FINAL IMPRESSION  1. NSTEMI (non-ST elevated myocardial infarction) (HCC)    2. Elevated troponin         The critical care time associated with the care of the patient was 31 minutes. Review chart for interventions. This time is exclusive of any other billable procedures.      Dede NG (Carly), am scribing for, and in the presence of, Venkata Kurtz II, M.D.    Electronically signed by: Dede Santoyo), 10/29/2018    Venkata NG II, M.D. personally performed the services described in this documentation, as scribed by Dede Marie in my presence, and it is both accurate and complete.    C.    The note accurately reflects work and decisions made by me.  Venkata Kurtz II  10/30/2018  12:15 AM

## 2018-10-30 NOTE — PROGRESS NOTES
[facility] Cardiology Follow-up Consult Note  Date of note:    10/30/2018      Consulting Physician: Mike Gardner M.D.    Patient ID:  Name:   Lv Mast     YOB: 1950  Age:   68 y.o.  male   MRN:   [id number]    Chief Complaint   Patient presents with   • Chest Pain     x 3 days        Interim Events:  Patient seen after cath still little bit somnolent from the sedation but overall feeling well no issues with his right radial cath site      ROS  No NV, No Bleeding, No dizziness   All other review of systems reviewed and negative.    Past medical, surgical, social, and family history reviewed and unchanged from admission except as noted in assessment and plan.    Medications: Reviewed in MAR  Current Facility-Administered Medications   Medication Dose Frequency Provider Last Rate Last Dose   • SODIUM CHLORIDE 0.9 % IV SOLN           • lidocaine (XYLOCAINE) 1 % injection 0.5 mL  0.5 mL Once PRN Duran Lion M.D.       • NS infusion   Continuous Tyrone Gillette M.D. 125 mL/hr at 10/30/18 1007     • bivalirudin (ANGIOMAX) 250 mg in NS 50 mL Infusion  0.2 mg/kg/hr Continuous Tyrone Gillette M.D. 3.9 mL/hr at 10/30/18 1030 0.2 mg/kg/hr at 10/30/18 1030   • [START ON 10/31/2018] ticagrelor (BRILINTA) tablet 90 mg  90 mg BID Tyrone Gillette M.D.       • omeprazole (PRILOSEC) capsule 20 mg  20 mg BID Camila Reyes M.D.   20 mg at 10/30/18 1006   • losartan (COZAAR) tablet 50 mg  50 mg DAILY Camila Reyes M.D.   Stopped at 10/30/18 0600   • LORazepam (ATIVAN) tablet 1 mg  1 mg BID Camila Reyes M.D.   1 mg at 10/30/18 0528   • aspirin EC (ECOTRIN) tablet 81 mg  81 mg DAILY Camila Reyes M.D.   81 mg at 10/30/18 0528   • senna-docusate (PERICOLACE or SENOKOT S) 8.6-50 MG per tablet 2 Tab  2 Tab BID Camila Reyes M.D.        And   • polyethylene glycol/lytes (MIRALAX) PACKET 1 Packet  1 Packet QDAY PRN Camila Reyes M.D.        And   • magnesium hydroxide (MILK OF MAGNESIA) suspension 30 mL  30  "mL QDAY PRN Camila Reyes M.D.        And   • bisacodyl (DULCOLAX) suppository 10 mg  10 mg QDAY PRN Camila Reyes M.D.       • 0.9 % NaCl with KCl 20 mEq infusion   Continuous Duran Lion M.D.   Stopped at 10/30/18 1004   • nitroglycerin (NITROSTAT) tablet 0.4 mg  0.4 mg Q5 MIN PRN Camila Reyes M.D.   0.4 mg at 10/29/18 2153   • morphine (pf) 4 mg/ml injection 2-4 mg  2-4 mg Q5 MIN PRN Camila Reyes M.D.   4 mg at 10/30/18 0001   • Metoprolol Tartrate (LOPRESSOR) injection 5 mg  5 mg Q5 MIN PRN Camila Reyes M.D.       • atorvastatin (LIPITOR) tablet 80 mg  80 mg Q EVENING Camila Reyes M.D.   80 mg at 10/29/18 2255   • heparin injection 3,800 Units  3,800 Units PRN Camila Reyes M.D.        And   • heparin infusion 25,000 units in 500 ml 0.45% nacl   Continuous Camila Reyes M.D.   Stopped at 10/30/18 0955   • nitroglycerin (NITRO-BID) 2 % ointment 0.5 Inch  0.5 Inch Q6HRS Duran Lion M.D.   0.5 Inch at 10/30/18 0528     Last reviewed on 10/29/2018  8:23 PM by Claudia Mcmahon PhT  No Known Allergies    Physical Exam  Body mass index is 28.3 kg/m². Blood pressure 109/65, pulse (!) 59, temperature 36.2 °C (97.2 °F), resp. rate (!) 21, height 1.854 m (6' 1\"), weight 97.3 kg (214 lb 8.1 oz), SpO2 100 %. Vitals:    10/30/18 0800 10/30/18 0945 10/30/18 1000 10/30/18 1015   BP:       Pulse: (!) 56 (!) 58 62 (!) 59   Resp: 19 (!) 23 19 (!) 21   Temp: 37 °C (98.6 °F)  36.2 °C (97.2 °F)    SpO2: 95% 94% 95% 100%   Weight:       Height:        Oxygen Therapy:  Pulse Oximetry: 100 %, O2 (LPM): 2, O2 Delivery: Nasal Cannula    General: no apparent distress  Eyes: nl conjunctiva  ENT: OP clear  Neck: no JVD   Lungs: normal respiratory effort, CTAB  Heart: RRR, no murmurs, no rubs or gallops,   EXT no edema bilateral lower extremities. + pedal pulses. no cyanosis right radial cath site has TR band  Abdomen: soft, non tender, non distended,  Neurological: No focal sensory deficits  Psychiatric: " Appropriate affect, A/O x 3  Skin: Warm extremities    Labs (personally reviewed and notable for):   Recent Results (from the past 24 hour(s))   TROPONIN    Collection Time: 10/29/18  7:23 PM   Result Value Ref Range    Troponin I 35.92 (H) 0.00 - 0.04 ng/mL   PT/INR    Collection Time: 10/29/18  7:23 PM   Result Value Ref Range    PT 15.1 (H) 12.0 - 14.6 sec    INR 1.18 (H) 0.87 - 1.13   PTT    Collection Time: 10/29/18  7:23 PM   Result Value Ref Range    APTT 64.7 (H) 24.7 - 36.0 sec   EKG    Collection Time: 10/29/18  7:28 PM   Result Value Ref Range    Report       [facility] Emergency Dept.    Test Date:  2018-10-29  Pt Name:    DEE GOSS                  Department: ER  MRN:        [id number]                      Room:       [facility]  Gender:     Male                         Technician: [id number]  :        1950                   Requested By:FLAQUITO KENT II  Order #:    [id number]                    Reading MD:    Measurements  Intervals                                Axis  Rate:       64                           P:          60  KS:         152                          QRS:        8  QRSD:       88                           T:          -10  QT:         392  QTc:        405    Interpretive Statements  SINUS RHYTHM  PROBABLE LEFT ATRIAL ABNORMALITY  PROBABLE INFERIOR INFARCT, AGE INDETERMINATE  CONSIDER POSTERIOR WALL INVOLVEMENT  No previous ECG available for comparison     EKG    Collection Time: 10/29/18  9:47 PM   Result Value Ref Range    Report       [facility]    Test Date:  2018-10-29  Pt Name:    DEE GOSS                  Department: [facility]  MRN:        [id number]                      Room:       [facility]  Gender:     Male                         Technician: DGG  :        1950                   Requested By:ARMEN GALLEGOS  Order #:    [id number]                    Reading MD: Arcadio Marina MD    Measurements  Intervals                                Axis  Rate:        62                           P:          49  ID:         152                          QRS:        -10  QRSD:       90                           T:          -17  QT:         408  QTc:        415    Interpretive Statements  SINUS RHYTHM  PROBABLE INFERIOR INFARCT, AGE INDETERMINATE  CONSIDER POSTERIOR WALL INVOLVEMENT  Compared to ECG 10/29/2018 19:28:55  No significant changes    Electronically Signed On 10- 8:14:16 PDT by Arcadio Marina MD     TROPONIN    Collection Time: 10/29/18 11:57 PM   Result Value Ref Range    Troponin I 31.14 (H) 0.00 - 0.04 ng/mL   APTT    Collection Time: 10/30/18  2:49 AM   Result Value Ref Range    APTT 55.1 (H) 24.7 - 36.0 sec   TROPONIN    Collection Time: 10/30/18  5:27 AM   Result Value Ref Range    Troponin I 33.77 (H) 0.00 - 0.04 ng/mL   CBC WITH DIFFERENTIAL    Collection Time: 10/30/18  5:27 AM   Result Value Ref Range    WBC 14.2 (H) 4.8 - 10.8 K/uL    RBC 4.47 (L) 4.70 - 6.10 M/uL    Hemoglobin 14.1 14.0 - 18.0 g/dL    Hematocrit 42.3 42.0 - 52.0 %    MCV 94.6 81.4 - 97.8 fL    MCH 31.5 27.0 - 33.0 pg    MCHC 33.3 (L) 33.7 - 35.3 g/dL    RDW 47.3 35.9 - 50.0 fL    Platelet Count 183 164 - 446 K/uL    MPV 11.3 9.0 - 12.9 fL    Neutrophils-Polys 77.20 (H) 44.00 - 72.00 %    Lymphocytes 12.30 (L) 22.00 - 41.00 %    Monocytes 9.60 0.00 - 13.40 %    Eosinophils 0.20 0.00 - 6.90 %    Basophils 0.20 0.00 - 1.80 %    Immature Granulocytes 0.50 0.00 - 0.90 %    Nucleated RBC 0.00 /100 WBC    Neutrophils (Absolute) 11.00 (H) 1.82 - 7.42 K/uL    Lymphs (Absolute) 1.75 1.00 - 4.80 K/uL    Monos (Absolute) 1.36 (H) 0.00 - 0.85 K/uL    Eos (Absolute) 0.03 0.00 - 0.51 K/uL    Baso (Absolute) 0.03 0.00 - 0.12 K/uL    Immature Granulocytes (abs) 0.07 0.00 - 0.11 K/uL    NRBC (Absolute) 0.00 K/uL   BASIC METABOLIC PANEL    Collection Time: 10/30/18  5:27 AM   Result Value Ref Range    Sodium 136 135 - 145 mmol/L    Potassium 4.0 3.6 - 5.5 mmol/L    Chloride 103 96 - 112 mmol/L    Co2 26  20 - 33 mmol/L    Glucose 151 (H) 65 - 99 mg/dL    Bun 16 8 - 22 mg/dL    Creatinine 1.05 0.50 - 1.40 mg/dL    Calcium 8.5 8.5 - 10.5 mg/dL    Anion Gap 7.0 0.0 - 11.9   ESTIMATED GFR    Collection Time: 10/30/18  5:27 AM   Result Value Ref Range    GFR If African American >60 >60 mL/min/1.73 m 2    GFR If Non African American >60 >60 mL/min/1.73 m 2   EKG STAT    Collection Time: 10/30/18  9:58 AM   Result Value Ref Range    Report       [facility]    Test Date:  2018-10-30  Pt Name:    DEE GOSS                  Department: 161  MRN:        [id number]                      Room:       T634  Gender:     Male                         Technician: [facility]  :        1950                   Requested By:DIYA GRIGSBY  Order #:    [id number]                    Reading MD:    Measurements  Intervals                                Axis  Rate:       65                           P:          40  MO:         192                          QRS:        -13  QRSD:       92                           T:          2  QT:         408  QTc:        425    Interpretive Statements  SINUS RHYTHM  INFERIOR INFARCT, AGE INDETERMINATE  Compared to ECG 10/29/2018 21:47:37  No significant changes         Cardiac Imaging and Procedures Review:    EKG and telemetry tracings personally reviewed    Impression and Medical Decision Making:  Principal Problem:    NSTEMI (non-ST elevated myocardial infarction) (HCC) POA: Unknown  Active Problems:    HTN (hypertension) POA: Unknown    HLD (hyperlipidemia) POA: Unknown  Resolved Problems:    * No resolved hospital problems. *      NSTEMI post PCI      Hypertension  We will need to monitor his blood pressures have been marginal holding his home meds as tolerated    Dyslipidemia  Increase statin to max    Likely keep in ICU on the Angiomax and for blood pressures    Future Appointments  Date Time Provider Department Center   12/10/2018 12:40 PM ELDA Aguillon [facility] None         It is my  pleasure to participate in the care of Mr. Mast.  Please do not hesitate to contact me with questions or concerns.    David Patel MD PhD [facility]  Cardiologist [facility]    10/30/2018    The services are for the treatment of hypertension and dyslipidemia separate from cardiac catheterization today

## 2018-10-30 NOTE — PROCEDURES
DATE OF SERVICE:  10/30/2018    REFERRING PHYSICIAN:  Duran Lion MD    PROCEDURE:  1.  Left heart catheterization.  2.  Coronary angiography.  3.  PTCA/stent placement of the mid right coronary artery.  4.  PTCA/stent placement of the mid left anterior descending artery.  5.  Left ventriculogram.  6.  Monitored conscious sedation.    PREPROCEDURE DIAGNOSES:  1.  Chest pain.  2.  Non-ST elevation myocardial infarction.    POSTPROCEDURE DIAGNOSES:  1.  Multivessel coronary artery disease including long occluded mid right   coronary artery, high grade focal mid left anterior descending artery stenosis  between 2 ectatic territory and additional diffuse nonobstructive left   anterior descending artery disease throughout, ectasia of the mid to distal   circumflex artery including obtuse marginal branch.  2.  Successful percutaneous transluminal coronary angioplasty/stent placement   of the mid right coronary artery with 3.0x24 mm Synergy drug-eluting stent.  3.  Successful percutaneous transluminal coronary angioplasty/stent placement   of the mid left anterior descending artery with 3.0x12 mm Synergy drug-eluting   stent.  4.  Normal left ventricular systolic function with ejection fraction of 55%,   diaphragmatic hypokinesis.  5.  Elevated left ventricular end-diastolic pressure.    INDICATION:  The patient is a 68-year-old male with past medical history   significant for hypertension and hyperlipidemia, prediabetes and chronic   tobacco abuse.  The patient was admitted to [facility] and   ruled in for non-STEMI.  He was scheduled for cardiac catheterization.    DESCRIPTION OF PROCEDURE:  After informed consent was signed by the   patient, the patient was brought to the cardiac catheterization laboratory.  He   was prepped and draped in usual sterile manner.  The right wrist area was   anesthetized with 2% Xylocaine.  A 4-Brazilian sheath was inserted into the right   radial artery using the modified  Seldinger technique.  Intra-arterial   verapamil and nitroglycerin were given.  IV heparin was given.  A 4-Gibraltarian   pigtail catheter was positioned into the left ventricle.  Left angiography was   performed.  This was exchanged for a JL 3.5 catheter, which was positioned   into the left main coronary artery.  Coronary angiography was performed.  This   was exchanged for a JR4 catheter, which was positioned into the right   coronary artery.  Coronary angiography was performed.  This catheter was   exchanged for a JR4 guide catheter, which was positioned into the right   coronary artery.  IV Angiomax was started.  A Prowater wire was used to cross   the occlusion.  A 1.2x12 mm over the wire was positioned into the distal   vessel using a dock wire.  The wires were removed.  Contrast was injected   through the lumen confirming placement in the lumen or distal right coronary   artery.  The wire area was replaced.  The 1.2x12 mm balloon was used to   predilate the identified stenosis.  This balloon was removed.  The dock wire   portion was removed.  The identified stenosis was also predilated with 2.5x15   mm Emerge balloon.  A 3.0x24 mm Synergy drug-eluting stent was successfully   positioned and deployed.  This stent was postdilated with 3.25x20 mm NC Emerge   balloon.  Wires, balloons, and guides were removed.  An EBU 3.5 guide   catheter was positioned into the left main coronary artery.  A Prowater wire   was used to cross the identified stenosis.  A Guidezilla catheter was   positioned for support.  The identified stenosis was predilated with 2.5 x 8   mm Emerge balloon.  A 3.0x12 mm Synergy drug-eluting stent was successfully   positioned and deployed.  This was postdilated with 3.0 x 8 mm NC Emerge   balloon.  The patient tolerated the procedure well.  At the end of procedure,   all wires, balloons, guide, and sheaths were removed.  He was given oral   Brilinta and then transferred to telemetry in stable  condition.    HEMODYNAMIC DATA:  Hemodynamic data shows aortic pressures of 100/70 with mean   of 85 mmHg and /0 with LVEDP of 30 mmHg.    AORTIC VALVE:  There was no significant gradient noted.    LEFT VENTRICULOGRAM:  A 10 mL of contrast was delivered for 3 seconds.    Ejection fraction was estimated to be 55% with diaphragmatic hypokinesis   noted.    ANGIOGRAM:  Left main coronary artery:  Left main coronary artery is a large caliber   vessel free of disease.  Left anterior descending artery:  Left anterior descending artery is a long   moderate caliber vessel, which wraps around the apex.  Diffusely, there are   luminal irregularities throughout of 20-40%.  Mid pole portion of the vessel,   there are 2 areas of ectasia and in between, there is a focal 80% stenosis.    There is a small caliber first diagonal branch with luminal irregularities of   10-20%.  There are 2 additional very small caliber diagonal branches without   significant stenosis.  Ramus intermedius:  Ramus intermedius is a moderate caliber bifurcating vessel   free of disease.  Left circumflex artery:  Left circumflex artery is a nondominant large caliber   vessel with proximal luminal irregularities of 20-30%.  Mid portion of the   vessel extending into the obtuse marginal branch is ectatic.  Distally, the   obtuse marginal branch, bifurcates into 2 moderate caliber branches, both with   luminal irregularities of 20-30%.  Right coronary artery:  Right coronary artery is a dominant vessel which is   occluded at the proximal portion.  Distally, there are moderate posterior   descending artery and 2 long posterolateral branches noted free of disease.    PERCUTANEOUS INTERVENTION:  1.  Mid right coronary artery occlusion.  Predilatation with 1.2x12 mm over   the wire  balloon 2.5x15 mm Emerge balloon.  Stent with 3.0x24 mm   Synergy drug-eluting stent, postdilatation with 3.25x20 mm NC Emerge balloon.  2.  Mid left anterior descending  artery 80% stenosis with 0% residual.    Predilatation with 2.5x8 mm Emerge balloon.  Stent with 3.0x12 mm Synergy   drug-eluting stent.  Postdilatation with 3.0 x 8 mm NC Emerge balloon.    IMPRESSION:  1.  Multivessel coronary artery disease including long occluded mid right   coronary artery, high grade focal mid left anterior descending artery stenosis  between 2 ectatic territory and additional diffuse nonobstructive left   anterior descending artery disease throughout, ectasia of the mid to distal   circumflex artery including obtuse marginal branch.  2.  Successful percutaneous transluminal coronary angioplasty/stent placement   of the mid right coronary artery with 3.0x24 mm Synergy drug-eluting stent.  3.  Successful percutaneous transluminal coronary angioplasty/stent placement   of the mid left anterior descending artery with 3.0x12 mm Synergy drug-eluting   stent.  4.  Normal left ventricular systolic function with ejection fraction of 55%,   diaphragmatic hypokinesis.  5.  Elevated left ventricular end-diastolic pressure.    RECOMMENDATIONS:  Recommend medical therapy with addition of Brilinta, smoking   cessation.    SEDATION: The patient's sedation was managed by myself with continuous   face to face time with the patient for 15 minutes from 08:00 to 08:15.       ____________________________________     MD LOLA LAWRENCE / AYE    DD:  10/30/2018 09:37:42  DT:  10/30/2018 10:14:48    D#:  [id number]  Job#:  [id number]

## 2018-10-30 NOTE — PROGRESS NOTES
Report from KIKA José. Pt transferred to room [place] [id number] with all belongings. No complaints of pain upon arrival.

## 2018-10-30 NOTE — PROGRESS NOTES
Sarahi from Lab called with critical result of troponin of 31.14 at 0105. Critical lab result read back to Sarahi.   Dr. Cox is aware of first troponin of 35.92.

## 2018-10-30 NOTE — PROGRESS NOTES
Spoke to Dr. Cox updated regarding pt's chest pain. Ordered to apply nitro paste and increase fluids to 125 mL/hr.

## 2018-10-30 NOTE — PROGRESS NOTES
Monitor summary: pt has been SR/SB.  HR: 55-70s  Measurements: (14/06/38)  Ectopy: rare    12 hour chart sheak

## 2018-10-30 NOTE — CONSULTS
Reason of Consult: Chest pain    Consulting Physician:      HPI:  This is a 68-year-old male with a history of hypertension and hyperlipidemia who is a former smoker but not currently smoking, denies drug and alcohol use or family history of precocious CAD.  He initially developed the onset of chest pain radiating to both shoulders 3-4 days ago after lifting a lawnmower.  Initially it was associated with diaphoresis and lightheadedness.  It has been persistent since that time until his arrival in the emergency department at the outside facility at the behest of his primary care physician.  He received 1 dose of nitroglycerin aspirin and heparin infusion and his thus far been chest pain-free.  Initial laboratory evaluation reveals an elevated troponin and an ECG compatible with Q wave inferior myocardial infarction without ST elevation.    Past Medical History:   Diagnosis Date   • GERD (gastroesophageal reflux disease)    • Hyperlipidemia    • Hypertension    • Hypothyroidism        Social History     Social History   • Marital status: N/A     Spouse name: N/A   • Number of children: N/A   • Years of education: N/A     Occupational History   • Not on file.     Social History Main Topics   • Smoking status: Former Smoker   • Smokeless tobacco: Not on file   • Alcohol use No   • Drug use: No   • Sexual activity: Not on file     Other Topics Concern   • Not on file     Social History Narrative   • No narrative on file       No current facility-administered medications on file prior to encounter.      No current outpatient prescriptions on file prior to encounter.       Current Facility-Administered Medications   Medication Dose Frequency Provider Last Rate Last Dose   • omeprazole (PRILOSEC) capsule 20 mg  20 mg BID Camila Reyes M.D.       • [START ON 10/30/2018] losartan (COZAAR) tablet 50 mg  50 mg DAILY Camila Reyes M.D.       • LORazepam (ATIVAN) tablet 1 mg  1 mg BID Camila Reyes M.D.       •  "[START ON 10/30/2018] aspirin EC (ECOTRIN) tablet 81 mg  81 mg DAILY Camila Reyes M.D.       • senna-docusate (PERICOLACE or SENOKOT S) 8.6-50 MG per tablet 2 Tab  2 Tab BID Camila Reyes M.D.        And   • polyethylene glycol/lytes (MIRALAX) PACKET 1 Packet  1 Packet QDAY PRN Camila Reyes M.D.        And   • magnesium hydroxide (MILK OF MAGNESIA) suspension 30 mL  30 mL QDAY PRN Camila Reyes M.D.        And   • bisacodyl (DULCOLAX) suppository 10 mg  10 mg QDAY PRN Camila Reyes M.D.       • 0.9 % NaCl with KCl 20 mEq infusion   Continuous Duran Lion M.D.       • nitroglycerin (NITROSTAT) tablet 0.4 mg  0.4 mg Q5 MIN PRN Camila Reyes M.D.   0.4 mg at 10/29/18 2153   • morphine (pf) 4 mg/ml injection 2-4 mg  2-4 mg Q5 MIN PRN Camila Reyes M.D.       • Metoprolol Tartrate (LOPRESSOR) injection 5 mg  5 mg Q5 MIN PRN Camila Reyes M.D.       • atorvastatin (LIPITOR) tablet 80 mg  80 mg Q EVENING Camila Reyes M.D.       • heparin injection 3,800 Units  3,800 Units PRN Camila Reyes M.D.        And   • heparin infusion 25,000 units in 500 ml 0.45% nacl   Continuous Camila Reyes M.D. 29 mL/hr at 10/29/18 2115 1,450 Units/hr at 10/29/18 2115   • [START ON 10/30/2018] nitroglycerin (NITRO-BID) 2 % ointment 0.5 Inch  0.5 Inch Q6HRS Duran Lion M.D.         Last reviewed on 10/29/2018  8:23 PM by Hanna Miguel    Patient has no known allergies.    History reviewed. No pertinent family history.    ROS: As per HPI all other systems reviewed and negative     Physical Exam   Blood pressure 109/65, pulse 68, temperature 36.8 °C (98.2 °F), resp. rate (!) 25, height 1.854 m (6' 1\"), weight 97.3 kg (214 lb 8.1 oz), SpO2 93 %.    Constitutional: Appears well-developed.   HENT: Normocephalic and atraumatic. No scleral icterus.   Neck: No JVD present.   Cardiovascular: Normal rate. Exam reveals no gallop and no friction rub. No murmur heard.   Pulmonary/Chest: CTAB    Abdominal: " S/NT/ND BS+   Musculoskeletal:  Pulses present. No atrophy. Strength normal.  Extremities: Exhibits no edema. No clubbing or cyanosis.   Skin: Skin is warm and dry.   Neuro: Non-focal, CN 2-12 intact grossly    No intake or output data in the 24 hours ending 10/29/18 2224            Recent Labs      10/29/18   1923   APTT  64.7*   INR  1.18*         Recent Labs      10/29/18   1923   TROPONINI  35.92*           EKG (10/29/2018 ):  I have personally reviewed the EKG this visit and discussed with the patient. It shows sinus rhythm with inferior infarct, old or recent.  No ST elevation or ST depression.  Repeat during chest pain is similar.    Imaging reviewed    Impressions:  1.  Non-ST elevation myocardial infarction, delayed presentation  2.  Hypertension  3.  Hyperlipidemia  4.  Former smoker    Recommendations:  He has been initiated on heparin and aspirin.  I was called to the bedside for more urgent evaluation as he had a recurrence of his chest discomfort rated at a 2 out of 10 that resolved with initiation of Nitropaste.  This was not associated with any change in his ECG.  Currently he is resting comfortably.  Repeat troponin did elevate but she has the expected course after an infarction as evidenced by his Q waves inferiorly.  I discussed with the patient and his bedside nurse that his clinical situation will drive the timing of coronary angiography, which I recommend as the primary therapeutic and diagnostic strategy.  Should he develop refractory angina, ST elevation or hemodynamic or electrical instability he will be taken urgently to the cardiac catheterization laboratory tonight.  Otherwise we will plan for coronary angiography in the morning.    1.  Aspirin, heparin weight-based protocol, Nitropaste which was not initiated in the emergency department.  2.  IV fluids  3.  It is very likely that his troponins will continue to trend up which is to be expected.  If he has the above clinical features he  will be taken urgently to the catheterization laboratory this evening, otherwise plan for coronary angiography with possible PCI in the morning.    Discussed with the referring physician and bedside nursing.    I have discussed the risks and benefits of cardiac catheterization as well as the procedure itself, rationale and appropriateness in detail with the patient today. Complications including but not limited to death, stroke, MI, urgent bypass surgery, contrast nephropathy, vascular complications, bleeding and infection were explained to the patient. The potential outcomes associated with the procedure (possible PCI, possible CABG, possible medical Rx only) were also discussed at length. The patient agrees to proceed.      Thank you for this interesting consultation. It was my pleasure to see Lv Mast today.    Duran Lion MD, FACC, [facility]  Division of Interventional Cardiology  [facility]

## 2018-10-30 NOTE — CARE PLAN
Problem: Safety  Goal: Will remain free from falls  Outcome: PROGRESSING AS EXPECTED  Fall precautions in place, treaded slippers on, personal belongings/phone in reach. Low risk for fall, bed alarm is on. Pt instructed to call for assistance prior to getting up. Verbalized understanding and is thus far compliant and calling appropriately.         Problem: Venous Thromboembolism (VTW)/Deep Vein Thrombosis (DVT) Prevention:  Goal: Patient will participate in Venous Thrombosis (VTE)/Deep Vein Thrombosis (DVT)Prevention Measures  Outcome: PROGRESSING AS EXPECTED  Heparin gtt infusing.     Problem: Knowledge Deficit  Goal: Knowledge of disease process/condition, treatment plan, diagnostic tests, and medications will improve  Outcome: PROGRESSING AS EXPECTED  Discussed POC with and wife, Dora and included: medications, frequent monitoring, test and lab draws. Questions and concerns addressed.       Problem: Pain Management  Goal: Pain level will decrease to patient's comfort goal  Outcome: PROGRESSING AS EXPECTED   10/30/18 0358   OTHER   Pain Scale 0 - 10  0     No complaints of pain after morphine administration and placement of nitrobid paste.     Problem: Psychosocial Needs:  Goal: Level of anxiety will decrease  Outcome: PROGRESSING AS EXPECTED   10/30/18 0000   OTHER   Patient Behaviors Anxious     Pt's scheduled ativan given.

## 2018-10-30 NOTE — ED TRIAGE NOTES
.  Chief Complaint   Patient presents with   • Chest Pain     x 3 days    biba transferred from [place] for elevated troponin. Pt reports Saturday pain radiates across back of shoulders after lifting his . Pt reports felt that he had a muscle strain. Pain radiating down arms. Today pain more severe and constant. Received nitro and heparin pta. Pt currently pain free.   erp to room

## 2018-10-31 DIAGNOSIS — I21.4 NSTEMI (NON-ST ELEVATED MYOCARDIAL INFARCTION) (HCC): ICD-10-CM

## 2018-10-31 LAB
ANION GAP SERPL CALC-SCNC: 8 MMOL/L (ref 0–11.9)
BUN SERPL-MCNC: 14 MG/DL (ref 8–22)
CALCIUM SERPL-MCNC: 8.3 MG/DL (ref 8.5–10.5)
CHLORIDE SERPL-SCNC: 106 MMOL/L (ref 96–112)
CO2 SERPL-SCNC: 21 MMOL/L (ref 20–33)
CREAT SERPL-MCNC: 0.86 MG/DL (ref 0.5–1.4)
ERYTHROCYTE [DISTWIDTH] IN BLOOD BY AUTOMATED COUNT: 46.5 FL (ref 35.9–50)
GLUCOSE SERPL-MCNC: 192 MG/DL (ref 65–99)
HCT VFR BLD AUTO: 37 % (ref 42–52)
HGB BLD-MCNC: 12.6 G/DL (ref 14–18)
MCH RBC QN AUTO: 31.8 PG (ref 27–33)
MCHC RBC AUTO-ENTMCNC: 34.1 G/DL (ref 33.7–35.3)
MCV RBC AUTO: 93.4 FL (ref 81.4–97.8)
PLATELET # BLD AUTO: 149 K/UL (ref 164–446)
PMV BLD AUTO: 11.5 FL (ref 9–12.9)
POTASSIUM SERPL-SCNC: 4.5 MMOL/L (ref 3.6–5.5)
RBC # BLD AUTO: 3.96 M/UL (ref 4.7–6.1)
SODIUM SERPL-SCNC: 135 MMOL/L (ref 135–145)
TROPONIN I SERPL-MCNC: 32.2 NG/ML (ref 0–0.04)
TROPONIN I SERPL-MCNC: 35.9 NG/ML (ref 0–0.04)
TROPONIN I SERPL-MCNC: 38.35 NG/ML (ref 0–0.04)
WBC # BLD AUTO: 11.3 K/UL (ref 4.8–10.8)

## 2018-10-31 PROCEDURE — A9270 NON-COVERED ITEM OR SERVICE: HCPCS | Performed by: INTERNAL MEDICINE

## 2018-10-31 PROCEDURE — 700102 HCHG RX REV CODE 250 W/ 637 OVERRIDE(OP): Performed by: HOSPITALIST

## 2018-10-31 PROCEDURE — 700101 HCHG RX REV CODE 250: Performed by: INTERNAL MEDICINE

## 2018-10-31 PROCEDURE — 99232 SBSQ HOSP IP/OBS MODERATE 35: CPT | Performed by: HOSPITALIST

## 2018-10-31 PROCEDURE — 700102 HCHG RX REV CODE 250 W/ 637 OVERRIDE(OP): Performed by: INTERNAL MEDICINE

## 2018-10-31 PROCEDURE — 85027 COMPLETE CBC AUTOMATED: CPT

## 2018-10-31 PROCEDURE — 99232 SBSQ HOSP IP/OBS MODERATE 35: CPT | Performed by: INTERNAL MEDICINE

## 2018-10-31 PROCEDURE — 84484 ASSAY OF TROPONIN QUANT: CPT | Mod: 91

## 2018-10-31 PROCEDURE — 51798 US URINE CAPACITY MEASURE: CPT

## 2018-10-31 PROCEDURE — 80048 BASIC METABOLIC PNL TOTAL CA: CPT

## 2018-10-31 PROCEDURE — A9270 NON-COVERED ITEM OR SERVICE: HCPCS | Performed by: HOSPITALIST

## 2018-10-31 PROCEDURE — 770022 HCHG ROOM/CARE - ICU (200)

## 2018-10-31 RX ADMIN — METOPROLOL TARTRATE 12.5 MG: 25 TABLET, FILM COATED ORAL at 11:21

## 2018-10-31 RX ADMIN — METOPROLOL TARTRATE 12.5 MG: 25 TABLET, FILM COATED ORAL at 17:59

## 2018-10-31 RX ADMIN — TICAGRELOR 90 MG: 90 TABLET ORAL at 05:27

## 2018-10-31 RX ADMIN — Medication 2 TABLET: at 18:00

## 2018-10-31 RX ADMIN — LOSARTAN POTASSIUM 50 MG: 25 TABLET, FILM COATED ORAL at 05:26

## 2018-10-31 RX ADMIN — ASPIRIN 81 MG: 81 TABLET, COATED ORAL at 05:26

## 2018-10-31 RX ADMIN — OMEPRAZOLE 20 MG: 20 CAPSULE, DELAYED RELEASE ORAL at 08:59

## 2018-10-31 RX ADMIN — NITROGLYCERIN 0.5 INCH: 20 OINTMENT TOPICAL at 18:00

## 2018-10-31 RX ADMIN — NITROGLYCERIN 0.5 INCH: 20 OINTMENT TOPICAL at 12:00

## 2018-10-31 RX ADMIN — POTASSIUM CHLORIDE AND SODIUM CHLORIDE: 900; 150 INJECTION, SOLUTION INTRAVENOUS at 23:46

## 2018-10-31 RX ADMIN — OMEPRAZOLE 20 MG: 20 CAPSULE, DELAYED RELEASE ORAL at 20:35

## 2018-10-31 RX ADMIN — LORAZEPAM 1 MG: 1 TABLET ORAL at 05:26

## 2018-10-31 RX ADMIN — LORAZEPAM 1 MG: 1 TABLET ORAL at 17:59

## 2018-10-31 RX ADMIN — POTASSIUM CHLORIDE AND SODIUM CHLORIDE: 900; 150 INJECTION, SOLUTION INTRAVENOUS at 15:01

## 2018-10-31 RX ADMIN — ATORVASTATIN CALCIUM 80 MG: 80 TABLET, FILM COATED ORAL at 17:59

## 2018-10-31 RX ADMIN — TICAGRELOR 90 MG: 90 TABLET ORAL at 17:59

## 2018-10-31 ASSESSMENT — ENCOUNTER SYMPTOMS
CONFUSION: 0
COUGH: 0
FEVER: 0
BACK PAIN: 0
NAUSEA: 0
POLYDIPSIA: 0
MYALGIAS: 0
APPETITE CHANGE: 0
DECREASED CONCENTRATION: 0
ABDOMINAL PAIN: 0
PALPITATIONS: 0
HEADACHES: 1
ORTHOPNEA: 0
TREMORS: 0
JOINT SWELLING: 0
NUMBNESS: 0
LIGHT-HEADEDNESS: 0
COLOR CHANGE: 0
HALLUCINATIONS: 0
SPEECH DIFFICULTY: 0
CONSTIPATION: 0
CHILLS: 0
EYE DISCHARGE: 0
MUSCULOSKELETAL NEGATIVE: 1
EYE REDNESS: 0
AGITATION: 0
ACTIVITY CHANGE: 0
EYE PAIN: 0
NERVOUS/ANXIOUS: 0
STRIDOR: 0
ABDOMINAL DISTENTION: 0
POLYPHAGIA: 0
PSYCHIATRIC NEGATIVE: 1
SHORTNESS OF BREATH: 0
DIAPHORESIS: 0
EYE ITCHING: 0
SEIZURES: 0
VOMITING: 0
WEAKNESS: 0
NEUROLOGICAL NEGATIVE: 1
BRUISES/BLEEDS EASILY: 0
DIARRHEA: 0
FLANK PAIN: 0
CHEST TIGHTNESS: 0
ADENOPATHY: 0
CHOKING: 0

## 2018-10-31 ASSESSMENT — PAIN SCALES - GENERAL
PAINLEVEL_OUTOF10: 0

## 2018-10-31 NOTE — CARE PLAN
Problem: Bowel/Gastric:  Goal: Will not experience complications related to bowel motility    Intervention: Assess baseline bowel pattern  SBA up to bathroom, bm small brown, formed.

## 2018-10-31 NOTE — PROGRESS NOTES
[facility] Hospitalist Progress Note    Date of Service: 10/31/2018    Chief Complaint  68 y.o. male admitted 10/29/2018 with NSTEMI    Interval Problem Update    Tolerating diet  SBP 90's    Denies chest pain or dizziness no arrhythmias      Consultants/Specialty  Cardiology  CC    Disposition  telemetry        Review of Systems   Constitutional: Negative for chills and fever.   HENT: Negative.    Eyes: Negative for discharge and redness.   Respiratory: Negative for shortness of breath.    Cardiovascular: Negative for chest pain, palpitations and orthopnea.   Gastrointestinal: Negative for abdominal pain, nausea and vomiting.   Musculoskeletal: Negative.    Neurological: Negative.    Psychiatric/Behavioral: Negative.    All other systems reviewed and are negative.     Physical Exam  Laboratory/Imaging   Hemodynamics  Temp (24hrs), Av.8 °C (98.3 °F), Min:36.1 °C (97 °F), Max:37.3 °C (99.2 °F)   Temperature: 37.1 °C (98.7 °F)  Pulse  Av.8  Min: 56  Max: 84 Heart Rate (Monitored): 72  NIBP: (!) 89/55      Respiratory      Respiration: 18, Pulse Oximetry: 95 %, O2 Daily Delivery Respiratory : Silicone Nasal Cannula             Fluids    Intake/Output Summary (Last 24 hours) at 10/31/18 1058  Last data filed at 10/31/18 0900   Gross per 24 hour   Intake             4870 ml   Output             1050 ml   Net             3820 ml       Nutrition  Orders Placed This Encounter   Procedures   • Diet Order Cardiac     Standing Status:   Standing     Number of Occurrences:   1     Order Specific Question:   Diet:     Answer:   Cardiac [6]     Physical Exam   Constitutional: He is oriented to person, place, and time. He appears well-developed.   HENT:   Head: Normocephalic and atraumatic.   Right Ear: External ear normal.   Left Ear: External ear normal.   Mouth/Throat: No oropharyngeal exudate.   Eyes: Pupils are equal, round, and reactive to light. Right eye exhibits no discharge. Left eye exhibits no discharge. No  scleral icterus.   Neck: Neck supple. No JVD present. No tracheal deviation present.   Cardiovascular: Normal rate and regular rhythm.  Exam reveals no gallop and no friction rub.    No murmur heard.  Pulmonary/Chest: Effort normal and breath sounds normal. No respiratory distress. He exhibits no tenderness.   Abdominal: Soft. Bowel sounds are normal. He exhibits no distension. There is no tenderness. There is no rebound and no guarding.   Musculoskeletal: He exhibits no edema or tenderness.   Neurological: He is alert and oriented to person, place, and time. No cranial nerve deficit. He exhibits normal muscle tone.   Skin: Skin is warm and dry. He is not diaphoretic. No cyanosis. Nails show no clubbing.   Psychiatric: He has a normal mood and affect. His behavior is normal.   Nursing note and vitals reviewed.      Recent Labs      10/30/18   0527  10/31/18   0515   WBC  14.2*  11.3*   RBC  4.47*  3.96*   HEMOGLOBIN  14.1  12.6*   HEMATOCRIT  42.3  37.0*   MCV  94.6  93.4   MCH  31.5  31.8   MCHC  33.3*  34.1   RDW  47.3  46.5   PLATELETCT  183  149*   MPV  11.3  11.5     Recent Labs      10/30/18   0527   SODIUM  136   POTASSIUM  4.0   CHLORIDE  103   CO2  26   GLUCOSE  151*   BUN  16   CREATININE  1.05   CALCIUM  8.5     Recent Labs      10/29/18   1923  10/30/18   0249  10/30/18   1049   APTT  64.7*  55.1*  75.4*   INR  1.18*   --   2.00*                  Assessment/Plan     * NSTEMI (non-ST elevated myocardial infarction) ([facility])   Assessment & Plan    S/p PCI to mid RCA and mid LAD    Continue aspirin Brilinta atorvastatin losartan  Start on low-dose metoprolol  Continue telemetry monitoring  EF 45%   Cardiology following discussed with Dr. Echols    Reviewed with patient importance of compliance with medications after discharge and lifestyle changes        HLD (hyperlipidemia)   Assessment & Plan    Continue atorvastatin        HTN (hypertension)   Assessment & Plan    Blood pressure low side on losartan and  metoprolol  Monitor          Quality-Core Measures   Reviewed items::  Labs reviewed, Medications reviewed and Radiology images reviewed  Hui catheter::  No Hui  DVT prophylaxis pharmacological::  Enoxaparin (Lovenox)        Plan of care reviewed with patient and wife  at bedside and discussed with nursing staff

## 2018-10-31 NOTE — CARE PLAN
Problem: Mobility  Goal: Risk for activity intolerance will decrease    Intervention: Assess and monitor signs of activity intolerance  Ambulated 250 with front wheel walker, steady

## 2018-10-31 NOTE — CARE PLAN
Problem: Safety  Goal: Will remain free from injury  Outcome: PROGRESSING AS EXPECTED  Pt free from injury-bed in low, locked position; call light & personal belongings within reach; hourly rounding; bed alarm on; fall prevention education provided    Problem: Pain Management  Goal: Pain level will decrease to patient's comfort goal  Outcome: PROGRESSING AS EXPECTED  Pt denies pain

## 2018-10-31 NOTE — CARE PLAN
Problem: Pain Management  Goal: Pain level will decrease to patient's comfort goal    Intervention: Follow pain managment plan developed in collaboration with patient and Interdisciplinary Team  Pt c/o headache, notified MD, ordered tylenol prn, pt verbalized relief.

## 2018-10-31 NOTE — CARE PLAN
Problem: Knowledge Deficit  Goal: Knowledge of disease process/condition, treatment plan, diagnostic tests, and medications will improve    Intervention: Assess knowledge level of disease process/condition, treatment plan, diagnostic tests, and medications  Dietician consult placed for cardiac education.

## 2018-10-31 NOTE — PROGRESS NOTES
Cardiovascular Nurse Navigator ([phone]) Note:    Reviewed ACS/PCI medications:  Dual Antiplatelet Therapy (DAPT):  aspirin + ticagrelor  Beta-Blocker:  Held due to hypotension  Statin:  Atorvastatin  ACEI/ARB:  Losartan  Aldosterone blocking agent:   N/A    Intensive Cardiac Rehab (ICR) Referral:  Referred on 10/30; has current inpatient orders for nutrition consult & PT for Phase I ICR  ICR follow-up and contact info added to AVS:  Yes    Cardiology Follow-Up:  12/10/18 (On cancellation list for earlier appointment)    Meds to Beds:  Pt referred to Meds to Beds program to obtain ticagrelor prescription prior to discharge.  Please call [phone] (or [phone] and request 'on-call anti-coag pharmacist' if after hours) if patient has not received medication at time of discharge.      Inpatient & Discharge Patient Education:  Bedside nursing to continually provide patient education on ACS meds, signs and symptoms to monitor for, and risk factor modification. Also at discharge please complete the “ACS” special instructions on the AVS.  Thank you and please call with questions.

## 2018-10-31 NOTE — PROGRESS NOTES
Ordered Ticagrelor 90mg #60 Take 1 BID to [facility] pharmacy.   Pt to be DC'ed tomorrow.     Jessica Barboza, ShalondaD

## 2018-11-01 VITALS
TEMPERATURE: 99.3 F | SYSTOLIC BLOOD PRESSURE: 109 MMHG | RESPIRATION RATE: 18 BRPM | HEIGHT: 73 IN | WEIGHT: 214.51 LBS | HEART RATE: 75 BPM | OXYGEN SATURATION: 94 % | BODY MASS INDEX: 28.43 KG/M2 | DIASTOLIC BLOOD PRESSURE: 65 MMHG

## 2018-11-01 PROCEDURE — G8978 MOBILITY CURRENT STATUS: HCPCS | Mod: CH

## 2018-11-01 PROCEDURE — G8979 MOBILITY GOAL STATUS: HCPCS | Mod: CH

## 2018-11-01 PROCEDURE — G8980 MOBILITY D/C STATUS: HCPCS | Mod: CH

## 2018-11-01 PROCEDURE — A9270 NON-COVERED ITEM OR SERVICE: HCPCS | Performed by: HOSPITALIST

## 2018-11-01 PROCEDURE — 99239 HOSP IP/OBS DSCHRG MGMT >30: CPT | Performed by: HOSPITALIST

## 2018-11-01 PROCEDURE — 700102 HCHG RX REV CODE 250 W/ 637 OVERRIDE(OP): Performed by: INTERNAL MEDICINE

## 2018-11-01 PROCEDURE — 99232 SBSQ HOSP IP/OBS MODERATE 35: CPT | Performed by: INTERNAL MEDICINE

## 2018-11-01 PROCEDURE — A9270 NON-COVERED ITEM OR SERVICE: HCPCS | Performed by: INTERNAL MEDICINE

## 2018-11-01 PROCEDURE — 700102 HCHG RX REV CODE 250 W/ 637 OVERRIDE(OP): Performed by: HOSPITALIST

## 2018-11-01 PROCEDURE — 97161 PT EVAL LOW COMPLEX 20 MIN: CPT

## 2018-11-01 RX ORDER — ATORVASTATIN CALCIUM 80 MG/1
80 TABLET, FILM COATED ORAL DAILY
Qty: 30 TAB | Refills: 0 | Status: SHIPPED | OUTPATIENT
Start: 2018-11-01 | End: 2018-12-10 | Stop reason: SDUPTHER

## 2018-11-01 RX ADMIN — LORAZEPAM 1 MG: 1 TABLET ORAL at 06:03

## 2018-11-01 RX ADMIN — LOSARTAN POTASSIUM 50 MG: 25 TABLET, FILM COATED ORAL at 06:03

## 2018-11-01 RX ADMIN — ASPIRIN 81 MG: 81 TABLET, COATED ORAL at 06:02

## 2018-11-01 RX ADMIN — NITROGLYCERIN 0.5 INCH: 20 OINTMENT TOPICAL at 06:02

## 2018-11-01 RX ADMIN — METOPROLOL TARTRATE 12.5 MG: 25 TABLET, FILM COATED ORAL at 06:02

## 2018-11-01 RX ADMIN — NITROGLYCERIN 0.5 INCH: 20 OINTMENT TOPICAL at 00:00

## 2018-11-01 RX ADMIN — TICAGRELOR 90 MG: 90 TABLET ORAL at 06:01

## 2018-11-01 ASSESSMENT — COGNITIVE AND FUNCTIONAL STATUS - GENERAL
SUGGESTED CMS G CODE MODIFIER MOBILITY: CH
MOBILITY SCORE: 24

## 2018-11-01 ASSESSMENT — ENCOUNTER SYMPTOMS
AGITATION: 0
CONFUSION: 0
BRUISES/BLEEDS EASILY: 0
BACK PAIN: 0
APPETITE CHANGE: 0
MYALGIAS: 0
CHILLS: 0
WEAKNESS: 0
STRIDOR: 0
EYE PAIN: 0
ABDOMINAL PAIN: 0
HEADACHES: 1
DIAPHORESIS: 0
EYE REDNESS: 0
DIARRHEA: 0
COUGH: 0
CHEST TIGHTNESS: 0
ABDOMINAL DISTENTION: 0
NERVOUS/ANXIOUS: 0
ADENOPATHY: 0
SEIZURES: 0
LIGHT-HEADEDNESS: 0
CHOKING: 0
FLANK PAIN: 0
TREMORS: 0
JOINT SWELLING: 0
COLOR CHANGE: 0
PALPITATIONS: 0
POLYPHAGIA: 0
HALLUCINATIONS: 0
EYE DISCHARGE: 0
EYE ITCHING: 0
POLYDIPSIA: 0
ACTIVITY CHANGE: 0
NUMBNESS: 0
SPEECH DIFFICULTY: 0
CONSTIPATION: 0
DECREASED CONCENTRATION: 0
NAUSEA: 0

## 2018-11-01 ASSESSMENT — PAIN SCALES - GENERAL
PAINLEVEL_OUTOF10: 0

## 2018-11-01 ASSESSMENT — GAIT ASSESSMENTS
DISTANCE (FEET): 500
GAIT LEVEL OF ASSIST: SUPERVISED

## 2018-11-01 NOTE — THERAPY
"Physical Therapy Evaluation completed.   Bed Mobility:  Supine to Sit: Independent  Transfers: Sit to Stand: Independent  Gait: Level Of Assist: Supervised with No Equipment Needed       Plan of Care: Patient with no further skilled PT needs in the acute care setting at this time  Discharge Recommendations: Equipment: No Equipment Needed. Post-acute therapy Currently anticipate no further skilled therapy needs once patient is discharged from the inpatient setting.    See \"Rehab Therapy-Acute\" Patient Summary Report for complete documentation.     "

## 2018-11-01 NOTE — DISCHARGE INSTRUCTIONS
Discharge Instructions    Discharged to home by car with relative. Discharged via walking, hospital escort: Refused.  Special equipment needed: Not Applicable    Be sure to schedule a follow-up appointment with your primary care doctor or any specialists as instructed.     Discharge Plan:   Diet Plan: Discussed  Activity Level: Discussed  Confirmed Follow up Appointment: Appointment Scheduled  Confirmed Symptoms Management: Discussed  Medication Reconciliation Updated: Yes  Pneumococcal Vaccine Administered/Refused: Given (See MAR)  Influenza Vaccine Indication: Not indicated: Previously immunized this influenza season and > 8 years of age    I understand that a diet low in cholesterol, fat, and sodium is recommended for good health. Unless I have been given specific instructions below for another diet, I accept this instruction as my diet prescription.   Other diet: Cardiac      Special Instructions: Diagnosis:  Acute Coronary Syndrome (ACS) is a diagnosis that encompasses cardiac-related chest pain and heart attack. ACS occurs when the blood flow to the heart muscle is severely reduced or cut off completely due to a slow process called atherosclerosis.  Atherosclerosis is a disease in which the coronary arteries become narrow from a buildup of fat, cholesterol, and other substances that combine to form plaque. If the plaque breaks, a blood clot will form and block the blood flow to the heart muscle. This lack of blood flow can cause damage or death to the heart muscle which is called a heart attack or Myocardial Infarction (MI). There are two different types of MIs:  ST Elevation Myocardial Infarction or STEMI (the most severe type of heart attack) and Non-ST Elevation Myocardial Infarction or NSTEMI.    Treatment Plan:  · Cardiac Diet  - Low fat, low salt, low cholesterol   · Cardiac Rehab  - Your doctor has ordered you a referral to [facility].  Call [phone] to schedule an appointment.  · Attend my follow-up  appointment with my Cardiologist.  · Take my medications as prescribed by my doctor  · Exercise daily  · Reduce stress    Medications:  Certain medications are used to treat ACS.  Remember to always take medications as prescribed and never stop talking medications unless told by your doctor.    You have been prescribed the following medicatons:    Anti-platelet/blood thinner - Your Anti-platelet/Blood thinning medication is called Ticagrelor, and is used in combination with aspirin to prevent clots from forming in your heart and/or around your stent.  Your doctor will determine how long you need to be on this medicine.    · Is patient discharged on Warfarin / Coumadin?   No     Depression / Suicide Risk    As you are discharged from this [facility] facility, it is important to learn how to keep safe from harming yourself.    Recognize the warning signs:  · Abrupt changes in personality, positive or negative- including increase in energy   · Giving away possessions  · Change in eating patterns- significant weight changes-  positive or negative  · Change in sleeping patterns- unable to sleep or sleeping all the time   · Unwillingness or inability to communicate  · Depression  · Unusual sadness, discouragement and loneliness  · Talk of wanting to die  · Neglect of personal appearance   · Rebelliousness- reckless behavior  · Withdrawal from people/activities they love  · Confusion- inability to concentrate     If you or a loved one observes any of these behaviors or has concerns about self-harm, here's what you can do:  · Talk about it- your feelings and reasons for harming yourself  · Remove any means that you might use to hurt yourself (examples: pills, rope, extension cords, firearm)  · Get professional help from the community (Mental Health, Substance Abuse, psychological counseling)  · Do not be alone:Call your Safe Contact- someone whom you trust who will be there for you.  · Call your local CRISIS HOTLINE  662-4087 or 948-314-5054  · Call your local Children's Mobile Crisis Response Team [place] (715) 620-4214 or www.[employer]  · Call the toll free National Suicide Prevention Hotlines   · National Suicide Prevention Lifeline 402-354-RNKR (2365)  · [employer] Line Network 800-SUICIDE (080-4481)    Heart Attack  A heart attack (myocardial infarction, MI) causes damage to your heart that cannot be fixed. A heart attack can happen when a heart (coronary) artery becomes blocked or narrowed. This cuts off the blood supply and oxygen to your heart.  When one or more of your coronary arteries becomes blocked, that area of your heart begins to die. This causes the pain you feel during a heart attack. Heart attack pain can also occur in one part of the body but be felt in another part of the body (referred pain). You may feel referred heart attack pain in your left arm, neck, or jaw. Pain may even be felt in the right arm.  What are the causes?  Many conditions can cause a heart attack. These include:  · Atherosclerosis. This is when a fatty substance (plaque) gradually builds up in the arteries. This buildup can block or reduce the blood supply to one or more of the heart arteries.  · A blood clot. A blood clot can develop suddenly when plaque breaks up (ruptures) within a heart artery. A blood clot can block the heart artery, which prevents blood flow to the heart.  · Severe tightening (spasm) of the heart artery. This cuts off blood flow through the artery.  What increases the risk?     People at risk for heart attack usually have one or more of the following risk factors:  · High blood pressure (hypertension).  · High cholesterol.  · Smoking.  · Being male.  · Being overweight or obese.  · Older aged.  · A family history of heart disease.  · Lack of exercise.  · Diabetes.  · Stress.  · Drinking too much alcohol.  · Using illegal street drugs, such as cocaine and methamphetamines.  What are the signs or  symptoms?  Heart attack symptoms can vary from person to person. Symptoms depend on factors like gender and age.  · In both men and women, heart attack symptoms can include the following:  ¨ Chest pain. This may feel like crushing, squeezing, or a feeling of pressure.  ¨ Shortness of breath.  ¨ Heartburn or indigestion with or without vomiting, shortness of breath, or sweating.  ¨ Sudden cold sweats.  ¨ Sudden light-headedness.  ¨ Upper back pain.  · Women can have unique heart attack symptoms, such as:  ¨ Unexplained feelings of nervousness or anxiety.  ¨ Discomfort between the shoulder blades or upper back.  ¨ Tingling in the hands and arms.  · Older people (of both genders) can have subtle heart attack symptoms, such as:  ¨ Sweating.  ¨ Shortness of breath.  ¨ General tiredness or not feeling well.  How is this diagnosed?  Diagnosing a heart attack involves several tests. They include:  · An assessment of your vital signs. This includes checking your:  ¨ Heart rhythm.  ¨ Blood pressure.  ¨ Breathing rate.  ¨ Oxygen level.  · An ECG (electrocardiogram) to measure the electrical activity of your heart.  · Blood tests called cardiac markers. In these tests, blood is drawn at scheduled times to check for the specific proteins or enzymes released by damaged heart muscle.  · A chest X-ray.  · An echocardiogram to evaluate heart motion and blood flow.  · Coronary angiography to look at the heart arteries.  How is this treated?  Treatment for a heart attack may include:  · Medicine that breaks up or dissolves blood clots in the heart artery.  · Angioplasty.  · Cardiac stent placement.  · Intra-aortic balloon pump therapy (IABP).  · Open heart surgery (coronary artery bypass graft, CABG).  Follow these instructions at home:  · Take medicines only as directed by your health care provider. You may need to take medicine after a heart attack to:  ¨ Keep your blood from clotting too easily.  ¨ Control your blood  pressure.  ¨ Lower your cholesterol.  ¨ Control abnormal heart rhythms.  · Do not take the following medicines unless your health care provider approves:  ¨ Nonsteroidal anti-inflammatory drugs (NSAIDs), such as ibuprofen, naproxen, or celecoxib.  ¨ Vitamin supplements that contain vitamin A, vitamin E, or both.  ¨ Hormone replacement therapy that contains estrogen with or without progestin.  · Make lifestyle changes as directed by your health care provider. These may include:  ¨ Quitting smoking, if you smoke.  ¨ Getting regular exercise. Ask your health care provider to suggest some activities that are safe for you.  ¨ Eating a heart-healthy diet. A registered dietitian can help you learn healthy eating options.  ¨ Maintaining a healthy weight.  ¨ Managing diabetes, if necessary.  ¨ Reducing stress.  ¨ Limiting how much alcohol you drink.  Get help right away if:  · You have sudden, unexplained chest discomfort.  · You have sudden, unexplained discomfort in your arms, back, neck, or jaw.  · You have shortness of breath at any time.  · You suddenly start to sweat or your skin gets clammy.  · You feel nauseous or vomit.  · You suddenly feel light-headed or dizzy.  · Your heart begins to beat fast or feels like it is skipping beats.  These symptoms may represent a serious problem that is an emergency. Do not wait to see if the symptoms will go away. Get medical help right away. Call your local emergency services (911 in the [place] Do not drive yourself to the hospital.   This information is not intended to replace advice given to you by your health care provider. Make sure you discuss any questions you have with your health care provider.  Document Released: 12/18/2006 Document Revised: 05/25/2017 Document Reviewed: 02/20/2015  Elsevier Interactive Patient Education © 2017 [employer]

## 2018-11-01 NOTE — PROGRESS NOTES
Cardiology Follow Up Progress Note    Date of Service  11/1/2018    Attending Physician  Mike Gardner M.D.    Chief Complaint   STEMI    HPI  Lv Mast is a 68 y.o. male admitted 10/29/2018 with ACS    Interim Events  Still with some residual SOB  No further chest pain  BP controlled overnight  Wants to go home today  Has knee pain that limits his physical acticty    Review of Systems  Review of Systems   Constitutional: Negative for activity change, appetite change, chills and diaphoresis.   Eyes: Negative for pain, discharge, redness and itching.   Respiratory: Negative for cough, choking, chest tightness and stridor.    Cardiovascular: Negative for palpitations.   Gastrointestinal: Negative for abdominal distention, abdominal pain, constipation, diarrhea and nausea.   Endocrine: Negative for cold intolerance, heat intolerance, polydipsia and polyphagia.   Genitourinary: Negative for difficulty urinating, dysuria, enuresis, flank pain, frequency, genital sores and hematuria.   Musculoskeletal: Negative for back pain, gait problem, joint swelling and myalgias.   Skin: Negative for color change, pallor and rash.   Neurological: Positive for headaches. Negative for tremors, seizures, syncope, speech difficulty, weakness, light-headedness and numbness.   Hematological: Negative for adenopathy. Does not bruise/bleed easily.   Psychiatric/Behavioral: Negative for agitation, behavioral problems, confusion, decreased concentration and hallucinations. The patient is not nervous/anxious.        Vital signs in last 24 hours  Temp:  [36.5 °C (97.7 °F)-37.4 °C (99.3 °F)] 37.4 °C (99.3 °F)  Pulse:  [59-92] 73  Resp:  [17-33] 18    Physical Exam  Physical Exam   Constitutional: He is oriented to person, place, and time. He appears well-developed and well-nourished. No distress.   HENT:   Head: Normocephalic.   Mouth/Throat: Oropharynx is clear and moist.   Eyes: Pupils are equal, round, and reactive to light. EOM are  normal. Right eye exhibits no discharge. Left eye exhibits no discharge. No scleral icterus.   Neck: Normal range of motion. Neck supple. No JVD present. No tracheal deviation present.   Cardiovascular: Normal rate, regular rhythm, S1 normal, S2 normal, normal heart sounds, intact distal pulses and normal pulses.  Exam reveals no gallop, no S3, no S4 and no friction rub.    No murmur heard.   No systolic murmur is present    No diastolic murmur is present   Pulses:       Carotid pulses are 2+ on the right side, and 2+ on the left side.       Radial pulses are 2+ on the right side, and 2+ on the left side.        Dorsalis pedis pulses are 2+ on the right side, and 2+ on the left side.        Posterior tibial pulses are 2+ on the right side, and 2+ on the left side.   Pulmonary/Chest: Effort normal and breath sounds normal. No respiratory distress. He has no wheezes. He has no rales.   Abdominal: Soft. Bowel sounds are normal. He exhibits no distension and no mass. There is no tenderness. There is no rebound and no guarding.   Musculoskeletal: He exhibits no edema.   Neurological: He is alert and oriented to person, place, and time. No cranial nerve deficit.   Skin: Skin is warm and dry. He is not diaphoretic. No pallor.   Psychiatric: He has a normal mood and affect. His behavior is normal. Judgment and thought content normal.   Nursing note and vitals reviewed.      Lab Review  Lab Results   Component Value Date/Time    WBC 11.3 (H) 10/31/2018 05:15 AM    RBC 3.96 (L) 10/31/2018 05:15 AM    HEMOGLOBIN 12.6 (L) 10/31/2018 05:15 AM    HEMATOCRIT 37.0 (L) 10/31/2018 05:15 AM    MCV 93.4 10/31/2018 05:15 AM    MCH 31.8 10/31/2018 05:15 AM    MCHC 34.1 10/31/2018 05:15 AM    MPV 11.5 10/31/2018 05:15 AM      Lab Results   Component Value Date/Time    SODIUM 135 10/31/2018 10:08 AM    POTASSIUM 4.5 10/31/2018 10:08 AM    CHLORIDE 106 10/31/2018 10:08 AM    CO2 21 10/31/2018 10:08 AM    GLUCOSE 192 (H) 10/31/2018 10:08 AM     BUN 14 10/31/2018 10:08 AM    CREATININE 0.86 10/31/2018 10:08 AM      No results found for: ASTSGOT, ALTSGPT  Lab Results   Component Value Date/Time    TROPONINI 32.20 (H) 10/31/2018 10:08 AM             Cardiac Imaging and Procedures Review  EKG:  My personal interpretation of the EKG dated 10/30/2018 is NSR, inferior infarct age undetermined     Echocardiogram: 10/30/2018  CONCLUSIONS  No prior study is available for comparison.   Normal left ventricular chamber size.  Mildly reduced left ventricular   systolic function. Left ventricular ejection fraction is visually   estimated to be 45%. Wall motion is difficult to assess with the   limitations of image quality. Consider contrast study to better   evaluate for wall motion abnormality and LVEF.  Technically difficult study incomplete information is obtained     Cardiac Catheterization:  PROCEDURE:  1.  Left heart catheterization.  2.  Coronary angiography.  3.  PTCA/stent placement of the mid right coronary artery.  4.  PTCA/stent placement of the mid left anterior descending artery.  5.  Left ventriculogram.  6.  Monitored conscious sedation.  Imaging  Chest X-Ray:  N/A    Stress Test:  N/A    Assessment/Plan  No new Assessment & Plan notes have been filed under this hospital service since the last note was generated.  Service: Cardiology  67 yo  With CAd s/p stent to his RCA and mid LAD.  He is hesitant to change his lifestyle.  He is also hesitant to talk about his medical care.  He was given strict instructions to not stop his antiplatelet drugs under any circumstances without talking to cardiology.  He can be discharged today.        1. CAD s/p stent    - asa    - ticagrelor 90 BID     2. HLD    - atorva 80     3. HTN    - per below     4. Stage B systolic heart failure    - metop 12.5 BID    - cont losartan 50    Thank you for allowing me to participate in the care of this patient.  Cardiology will sign off on this patient    Please contact me with any  questions.    David Echols M.D.   Cardiologist, [facility]  [phone]

## 2018-11-01 NOTE — PROGRESS NOTES
"Patient found in room in street clothes with complaint, \"I'm bleeding.\" On further assessment patient had removed his right forearm PIV; pressure held until bleeding stopped and bandage applied. Patient educated that, although the cardiologist had explained to him that he could go home today there were no DC orders yet and that he had to remain on telemetry. Patient reconnected to telemetry in NSR, rate 75. Patient expresses understanding. Will continue to monitor.    Terrence Crespo RN  "

## 2018-11-01 NOTE — DISCHARGE SUMMARY
Discharge Summary    CHIEF COMPLAINT ON ADMISSION  Chief Complaint   Patient presents with   • Chest Pain     x 3 days        Reason for Admission  EMS 07     Admission Date  10/29/2018    CODE STATUS  Full Code    HPI & HOSPITAL COURSE  This is a 68 y.o. male here with Chest pain secondary to non-STEMI, he was admitted to the intensive care unit and evaluated by cardiology and taken to the Cath Lab where he underwent mid RCA and mid LAD PTCA/stent placement.  He was monitored in the ICU after his procedure, he did not have any significant arrhythmias he still had some mild dyspnea with exertion but otherwise was clinically stable.  He was anxious to go home today and has been cleared by cardiology for discharge  Echocardiogram revealed ejection fraction of 45%  I reviewed with the patient and his wife the importance of complying with his medications especially Brilinta and aspirin, reinforced lifestyle changes as well as importance to keep scheduled follow-up with cardiology.  Discharge plan reviewed with nursing staff and also discussed this morning with Dr. Echols from cardiology    Therefore, he is discharged in good and stable condition to home with close outpatient follow-up.    The patient met 2-midnight criteria for an inpatient stay at the time of discharge.    Discharge Date  11/1/2018    FOLLOW UP ITEMS POST DISCHARGE  Follow-up with cardiology    DISCHARGE DIAGNOSES  Principal Problem:    NSTEMI (non-ST elevated myocardial infarction) (HCC) POA: Unknown  Active Problems:    HTN (hypertension) POA: Unknown    HLD (hyperlipidemia) POA: Unknown  Resolved Problems:    * No resolved hospital problems. *      FOLLOW UP  Future Appointments  Date Time Provider Department Center   12/10/2018 12:40 PM ELDA Aguillon [facility] None     Fazal Car M.D.  [address] [place] [id number]  [phone]    Go on 11/5/2018  PLEASE ARRIVE AT 2:00PM FOR YOUR APPOINTMENT. THANK YOU    [email or web address]  [address]  [address]  [facility] [address]  [phone]  Schedule an appointment as soon as possible for a visit  Your doctor has referred you to cardiac rehab which is important to promote your heart recovery; please call to make an appointment.      MEDICATIONS ON DISCHARGE     Medication List      START taking these medications      Instructions   metoprolol 25 MG Tabs  Commonly known as:  LOPRESSOR   Take 0.5 Tabs by mouth 2 Times a Day.  Dose:  12.5 mg     ticagrelor 90 MG Tabs tablet  Commonly known as:  BRILINTA   Doctor's comments:  Meds to bed  Take 1 Tab by mouth 2 Times a Day.  Dose:  90 mg        CHANGE how you take these medications      Instructions   atorvastatin 80 MG tablet  What changed:  · medication strength  · how much to take  Commonly known as:  LIPITOR   Take 1 Tab by mouth every day.  Dose:  80 mg        CONTINUE taking these medications      Instructions   acetaminophen 500 MG Tabs  Commonly known as:  TYLENOL   Take 2,000 mg by mouth every evening as needed (Pain, Sleep).  Dose:  2000 mg     aspirin EC 81 MG Tbec  Commonly known as:  ECOTRIN   Take 81 mg by mouth every day.  Dose:  81 mg     diphenhydrAMINE 25 MG Tabs  Commonly known as:  BENADRYL   Take 50 mg by mouth 1 time daily as needed for Sleep.  Dose:  50 mg     LORazepam 1 MG Tabs  Commonly known as:  ATIVAN   Take 1 mg by mouth 2 Times a Day.  Dose:  1 mg     losartan 50 MG Tabs  Commonly known as:  COZAAR   Take 50 mg by mouth every day.  Dose:  50 mg     omeprazole 20 MG delayed-release capsule  Commonly known as:  PRILOSEC   Take 20 mg by mouth 2 times a day.  Dose:  20 mg        STOP taking these medications    ibuprofen 200 MG Tabs  Commonly known as:  MOTRIN            Allergies  No Known Allergies    DIET  Orders Placed This Encounter   Procedures   • Diet Order Cardiac     Standing Status:   Standing     Number of Occurrences:   1     Order Specific Question:   Diet:     Answer:   Cardiac [6]   •  Discontinue Diet Tray     Standing Status:   Standing     Number of Occurrences:   1       ACTIVITY  As tolerated.  Weight bearing as tolerated    CONSULTATIONS  Cardiology    PROCEDURES  Coronary angiography    LABORATORY  Lab Results   Component Value Date    SODIUM 135 10/31/2018    POTASSIUM 4.5 10/31/2018    CHLORIDE 106 10/31/2018    CO2 21 10/31/2018    GLUCOSE 192 (H) 10/31/2018    BUN 14 10/31/2018    CREATININE 0.86 10/31/2018        Lab Results   Component Value Date    WBC 11.3 (H) 10/31/2018    HEMOGLOBIN 12.6 (L) 10/31/2018    HEMATOCRIT 37.0 (L) 10/31/2018    PLATELETCT 149 (L) 10/31/2018        Total time of the discharge process exceeds 35 minutes.

## 2018-11-01 NOTE — PROGRESS NOTES
[facility]    Delivered Brilinta to pt's significant other.  Provided education and answered pt questions.    Chalo Haddad, PharmD

## 2018-11-02 ENCOUNTER — PATIENT OUTREACH (OUTPATIENT)
Dept: HEALTH INFORMATION MANAGEMENT | Facility: OTHER | Age: 68
End: 2018-11-02

## 2018-11-02 NOTE — PROGRESS NOTES
Discharge outreach call. Could not get a hold of pt. LVM with phone number to call back if any questions or concerns.

## 2018-12-04 ENCOUNTER — NON-PROVIDER VISIT (OUTPATIENT)
Dept: CARDIOLOGY | Facility: MEDICAL CENTER | Age: 68
End: 2018-12-04
Payer: MEDICARE

## 2018-12-04 DIAGNOSIS — Z95.5 STENTED CORONARY ARTERY: Primary | ICD-10-CM

## 2018-12-04 LAB — EKG IMPRESSION: NORMAL

## 2018-12-04 PROCEDURE — G0423 INTENS CARDIAC REHAB NO EXER: HCPCS | Mod: 59 | Performed by: FAMILY MEDICINE

## 2018-12-04 PROCEDURE — G0422 INTENS CARDIAC REHAB W/EXERC: HCPCS | Performed by: FAMILY MEDICINE

## 2018-12-04 ASSESSMENT — PATIENT HEALTH QUESTIONNAIRE - PHQ9
6. FEELING BAD ABOUT YOURSELF - OR THAT YOU ARE A FAILURE OR HAVE LET YOURSELF OR YOUR FAMILY DOWN: 0
1. LITTLE INTEREST OR PLEASURE IN DOING THINGS: 1
8. MOVING OR SPEAKING SO SLOWLY THAT OTHER PEOPLE COULD HAVE NOTICED. OR THE OPPOSITE, BEING SO FIGETY OR RESTLESS THAT YOU HAVE BEEN MOVING AROUND A LOT MORE THAN USUAL: 0
SUM OF ALL RESPONSES TO PHQ9 QUESTIONS 1 AND 2: 1
5. POOR APPETITE OR OVEREATING: 1
SUM OF ALL RESPONSES TO PHQ QUESTIONS 1-9: 6
7. TROUBLE CONCENTRATING ON THINGS, SUCH AS READING THE NEWSPAPER OR WATCHING TELEVISION: 0
3. TROUBLE FALLING OR STAYING ASLEEP OR SLEEPING TOO MUCH: 2
9. THOUGHTS THAT YOU WOULD BE BETTER OFF DEAD, OR OF HURTING YOURSELF: 0
2. FEELING DOWN, DEPRESSED, IRRITABLE, OR HOPELESS: 0
4. FEELING TIRED OR HAVING LITTLE ENERGY: 2
SUM OF ALL RESPONSES TO PHQ QUESTIONS 1-9: 6

## 2018-12-05 NOTE — PROGRESS NOTES
Cardiac Rehab Individual Treatment Plan Assessment: Initial 18 Session #     1   MRN: [id number]   Allergies: Patient has no known allergies.   Patient Name: Lv Mast : 1950 Risk Stratification: High    Diagnoses:   1. Stented coronary artery     Age: 68 y.o. Physician: Fazal Car M.D.    Date of Event: 10/31/18 Specialist: Nain   Risk Factors:  Hypertension, Hyperlipidemia, Diabetes, Family History, Obesity, Stress, Sedentary Lifestyle, Age, Male > 45   Exercise Nutrition Education Psychosocial   Stages of change Stages of change Stages of change Stages of change   Contemplate Contemplate Contemplate Contemplate   Fitness Test Lipids Learning Barriers Outcome Survey Tools   DIST: 1152  Available: No (not available) Learning Barriers: None FP QOL Overall Score: 15.3   Max HR: 95 Date:  (na) Family Support PHQ-9: 6   RPE: 8 Total:   Yes Nutrition Screen: 40 %   SPO2: 95 % Trig:   Lives: Spouse Intervention   MET: 2.69 HDL:   Tobacco Use Behavioral Health Consult: Yes   EF= 45 (10/30/2018) LDL:   History   Smoking Status   • Light Tobacco Smoker   • Types: Cigars   Smokeless Tobacco   • Never Used      Physician Referral: Yes   Ambulatory Status Diabetes Smoking Intervention Identifies Stressors: Yes   Fall Risk Assessed: Yes Diabetes: Yes Smoking Cessation Referral: N/A Drug Intervention: Yes   Exercise Ambulatory Status Assist Devices: None HbA1C:  (na) Date:  (na) Ind. Education / Counseling: N/A Education   Exercise Prescription Monitors BS at Home: Yes Tobacco Adjunct: N/A Psychosocial Education: Coping Techniques, Positive Support System, S/S Depression   Mode: Treadmill, NuStep, UBE, Airdyne   Frequency:  (na) Random BS: 192 Education Intervention Target Goal   Frequency:  3 days/week Weight Management Healthy Heart Education: Class Schedule Given, Medications Reviewed, Patient Education Binder Provided, Risk Factors Discussed, Videos Viewed per Neda Assess presence or absence of  "depression using a valid screening: Yes   Duration:  20-30 Weight: 97.3 kg (214 lb 8 oz) Target Goal Use Stress Management: Yes   Intensity:  11-13 RPE Height: 185.4 cm (6' 0.99\") Complete Tobacco Cessation: Complete Tobacco Cessation: N/A Adverse Events: Yes   METS:  1.0-3.0 BMI (Calculated): 28.31 Educate / Review and have understanding of cardiac disease prevention: Educate / Review and have understanding of cardiac disease prevention: Yes Unexpected Events: Yes   Progression:  ^ increments of 1-3 to THR/RPE as tolerated Goal weight: 200 Medication Compliance: Yes    THR: THR: Other (see comment) History   Alcohol Use No         Angina with Exercise?  Angina with Exercise: No      Resistance Training?  Resistance Training: Yes      Hypertension      Diagnosed with HTN: Yes Intervention      Resting BP: 117/70 Dietician Consult/Class: Pending (scheduled)      Peak Ex BP: 144/85 Nurse/Patient Discussion: Yes     Intervention Diabetes Ed Referral: Yes     Home Exercise:  Yes Discuss Maintenance /Wt Loss: Yes     Mode: Walk Attend Material Mix School: Pending     Duration: 10 minutes 3 times daily Dietary Goal: >=58 rate your plate, low sodium     Frequency: 7 days active  Education     Education Nutrition Education: S&S Hypo/Hyper glycemia, Relate Diabetes to CAD, Healthy Eating, Sodium Reduction     Equipment Orientation, Exercise Safety, S/S to Report, RPE Scale, Warm Up / Cool Down, Physically Active Target Goal     Target Goal LDL-C < 100 if trig. > 200:  Yes       Start Individual Exercise Rx Yes LDL-C < 70 for high risk patient:  Yes       BP < 140/90 or < 130/80 if DM or CKD Yes Non HDL-C Should be < 130:  Yes       Aerobic activity 30 + min / day 5 days / wk Yes HbA1C < 7%: Yes         BMI < 25: Yes       Notes: Driving from [place], weather will be an issue, they are going on a 2 week vacation in January as well.   Initial Assessment:  Heart Sounds: S1S2 WNL       Lung Sounds: clear throughout bilaterally     " "  Edema: none present        Right Peripheral Pulses:  Carotid: 2+  Radial: 2+  Dorsalis Pedis: 2+  Posterior Tibialis: 2+   Left Peripheral Pulses:  Carotid: 2+  Radial: 2+  Dorsalis Pedis: 2+  Posterior Tibialis: 2+    Incision: none present       Color: pink       Frame Size: Medium        Cognitive Assessment: A&O X 4, no cognitive deficits reported.       Fall Assessment:    Gait: steady  Balance: unsteady intermittetly he states.   Upper Body: no pain or limitations with ROM  Lower Body: Hx: Left knee injury    Recent Falls: none reported.   Current Medications and Vaccinations: Reviewed  Patient Stated Goals: \"To be more active, to cut down on fat, sodium and sugar in my diet.\"  Other: Lv arrives for orientation.  Exercise current: he is not exercising currently.  Goal: to start an exercise routine and know what his limits are.  Progress: he is willing to start exercise as long as he feels he knows what's safe.  He will start the program when he returns from his cruise in late January.  Nutrition current: he and his wife have just begun reading labels.  He has not eaten tubbs since he had his event but says that he really likes it.  He also likes to eat red meat but is willing to reduce amounts.  He says that he drink Coke every day and is not sure if he is willing to give that up at this time.  Goals: to eat more vegetables, learn about heart healthy eating, reduce meat consumption overall, reduce fat, sodium and sugar.  Progress: willing to make some dietary changes at this time but isn't sure about exactly what he is willing to keep and give up.  Stress current: he says that he has a very busy mind and has always been that way.  He takes Lorazepam twice a day and says that it helps him with sleep and anxiety.  He is not really too aware of techniques to reduce stress and anxiety at this time.  Goals: to learn anxiety relieving techniques through our classes and videos.  Progress: willing to try some new " techniques to reduce anxiety.

## 2018-12-05 NOTE — PROGRESS NOTES
Intensive Cardiac Rehabilitation (ICR) Individual Treatment Plan (ITP) reviewed and signed.  Sincerely,  Brody James MD

## 2018-12-10 ENCOUNTER — OFFICE VISIT (OUTPATIENT)
Dept: CARDIOLOGY | Facility: MEDICAL CENTER | Age: 68
End: 2018-12-10
Payer: MEDICARE

## 2018-12-10 VITALS
HEIGHT: 73 IN | WEIGHT: 218 LBS | SYSTOLIC BLOOD PRESSURE: 96 MMHG | HEART RATE: 72 BPM | OXYGEN SATURATION: 95 % | BODY MASS INDEX: 28.89 KG/M2 | DIASTOLIC BLOOD PRESSURE: 52 MMHG

## 2018-12-10 DIAGNOSIS — E78.2 MIXED HYPERLIPIDEMIA: ICD-10-CM

## 2018-12-10 DIAGNOSIS — Z95.5 STATUS POST INSERTION OF DRUG ELUTING CORONARY ARTERY STENT: ICD-10-CM

## 2018-12-10 DIAGNOSIS — I21.4 NSTEMI (NON-ST ELEVATED MYOCARDIAL INFARCTION) (HCC): ICD-10-CM

## 2018-12-10 DIAGNOSIS — I10 ESSENTIAL HYPERTENSION: ICD-10-CM

## 2018-12-10 DIAGNOSIS — R73.09 ELEVATED GLUCOSE: ICD-10-CM

## 2018-12-10 DIAGNOSIS — I25.10 CORONARY ARTERY DISEASE INVOLVING NATIVE CORONARY ARTERY OF NATIVE HEART WITHOUT ANGINA PECTORIS: ICD-10-CM

## 2018-12-10 PROBLEM — K21.9 GASTROESOPHAGEAL REFLUX DISEASE WITHOUT ESOPHAGITIS: Status: ACTIVE | Noted: 2018-12-10

## 2018-12-10 PROBLEM — E03.9 ACQUIRED HYPOTHYROIDISM: Status: ACTIVE | Noted: 2018-12-10

## 2018-12-10 PROCEDURE — 99214 OFFICE O/P EST MOD 30 MIN: CPT | Performed by: NURSE PRACTITIONER

## 2018-12-10 RX ORDER — LOSARTAN POTASSIUM 50 MG/1
50 TABLET ORAL EVERY EVENING
Qty: 90 TAB | Refills: 3 | Status: SHIPPED | OUTPATIENT
Start: 2018-12-10 | End: 2020-03-02

## 2018-12-10 RX ORDER — ATORVASTATIN CALCIUM 80 MG/1
80 TABLET, FILM COATED ORAL EVERY EVENING
Qty: 90 TAB | Refills: 3 | Status: SHIPPED | OUTPATIENT
Start: 2018-12-10 | End: 2019-12-16

## 2018-12-10 ASSESSMENT — ENCOUNTER SYMPTOMS
ORTHOPNEA: 0
PND: 0
COUGH: 0
ABDOMINAL PAIN: 0
PALPITATIONS: 0
DIZZINESS: 0
WEAKNESS: 0
MYALGIAS: 0
CLAUDICATION: 0
SHORTNESS OF BREATH: 0

## 2018-12-10 NOTE — LETTER
[facility]   [address] [place] [address]  [place] [address]  Phone: [phone]  Fax: [phone]              Lv Mast  1950    Encounter Date: 12/10/2018    ELDA Aguillon          PROGRESS NOTE:  Chief Complaint   Patient presents with   • MI (Non ST Segment Elevation MI)     hospital follow up       Subjective:   Lv Mast is a 68 y.o. male who presents today with his wife, daughter, for hospital follow-up.  He had lifted a lawnmower and had some chest discomfort thinking it was muscular.  It persisted for a few days and did not have improvement with over-the-counter pain medications.  He was seen by his primary care who referred him to the emergency room in [place].  He was transferred to [facility] and underwent cardiac catheterization.    He was found to have obstructive disease in the right coronary artery and LAD.  He received synergy drug-eluting stents to these arteries.  He was started on aspirin and Brilinta.    He has a history of high cholesterol and hypertension.  And apparently he has hypothyroidism and is on thyroid medication.  Also he is on glipizide for elevated blood sugars but does not state that he is diabetic.  He does not know the names or dosages of his medications.    Gives a history of having been a  and was medically retired at age 42.    Since being on the hospital, he denies any chest discomfort, shortness of breath or palpitations.  When asked if he exercises he says he does as little as possible.    Past Medical History:   Diagnosis Date   • Anxiety    • GERD (gastroesophageal reflux disease)    • Hyperlipidemia    • Hypertension    • Hypothyroidism      Past Surgical History:   Procedure Laterality Date   • CARDIAC CATH  10/29/2018    Synergy stents to LAD , RCA   • ARTHROSCOPY, KNEE Left 06/1992   • CATARACT EXTRACTION WITH IOL       Family History   Problem Relation Age of  Onset   • Arthritis Mother    • Diabetes Mother    • Hypertension Mother    • Hyperlipidemia Mother    • Stroke Mother    • Cancer Maternal Grandmother         breast cancer   • Stroke Maternal Grandfather    • Stroke Paternal Grandfather 40        CVA   • Lung Disease Neg Hx    • Genetic Neg Hx    • Psychiatry Neg Hx    • Heart Disease Neg Hx    • Alcohol/Drug Neg Hx      Social History     Social History   • Marital status:      Spouse name: N/A   • Number of children: N/A   • Years of education: N/A     Occupational History   • Not on file.     Social History Main Topics   • Smoking status: Former Smoker     Types: Cigars     Quit date: 2008   • Smokeless tobacco: Never Used   • Alcohol use No   • Drug use: No   • Sexual activity: Not on file     Other Topics Concern   • Not on file     Social History Narrative   • No narrative on file     No Known Allergies  Outpatient Encounter Prescriptions as of 12/10/2018   Medication Sig Dispense Refill   • atorvastatin (LIPITOR) 80 MG tablet Take 1 Tab by mouth every evening. 90 Tab 3   • metoprolol (LOPRESSOR) 25 MG Tab Take 0.5 Tabs by mouth 2 Times a Day. 45 Tab 3   • ticagrelor (BRILINTA) 90 MG Tab tablet Take 1 Tab by mouth 2 Times a Day. 60 Tab 11   • losartan (COZAAR) 50 MG Tab Take 1 Tab by mouth every evening. 90 Tab 3   • aspirin EC (ECOTRIN) 81 MG Tablet Delayed Response Take 81 mg by mouth every day.     • LORazepam (ATIVAN) 1 MG Tab Take 1 mg by mouth 2 Times a Day.     • omeprazole (PRILOSEC) 20 MG delayed-release capsule Take 20 mg by mouth 2 times a day.     • diphenhydrAMINE (BENADRYL) 25 MG Tab Take 50 mg by mouth 1 time daily as needed for Sleep.     • acetaminophen (TYLENOL) 500 MG Tab Take 2,000 mg by mouth every evening as needed (Pain, Sleep).     • [DISCONTINUED] atorvastatin (LIPITOR) 80 MG tablet Take 1 Tab by mouth every day. 30 Tab 0   • [DISCONTINUED] metoprolol (LOPRESSOR) 25 MG Tab Take 0.5 Tabs by mouth 2 Times a Day. 30 Tab 0   •  "[DISCONTINUED] ticagrelor (BRILINTA) 90 MG Tab tablet Take 1 Tab by mouth 2 Times a Day. 60 Tab 0   • [DISCONTINUED] losartan (COZAAR) 50 MG Tab Take 50 mg by mouth every day.       No facility-administered encounter medications on file as of 12/10/2018.      Review of Systems   Constitutional: Negative for malaise/fatigue.   Respiratory: Negative for cough and shortness of breath.    Cardiovascular: Negative for chest pain, palpitations, orthopnea, claudication, leg swelling and PND.   Gastrointestinal: Negative for abdominal pain.   Musculoskeletal: Negative for myalgias.   Neurological: Negative for dizziness and weakness.        Objective:   BP (!) 96/52 (BP Location: Left arm, Patient Position: Sitting)   Pulse 72   Ht 1.854 m (6' 1\")   Wt 98.9 kg (218 lb)   SpO2 95%   BMI 28.76 kg/m²      Physical Exam   Constitutional: He is oriented to person, place, and time. He appears well-developed and well-nourished.   HENT:   Head: Normocephalic.   Eyes: EOM are normal.   Neck: Normal range of motion. No JVD present.   Cardiovascular: Normal rate and regular rhythm.  Exam reveals no friction rub.    No murmur heard.  Pulmonary/Chest: Effort normal.   Abdominal: Soft. Bowel sounds are normal.   Musculoskeletal: He exhibits no edema.   Neurological: He is alert and oriented to person, place, and time.   Skin: Skin is warm and dry.   Psychiatric: He has a normal mood and affect.   Angry and irritable affect.     Results for DEE GOSS (MRN [id number])    Ref. Range 10/31/2018 10:08   Sodium Latest Ref Range: 135 - 145 mmol/L 135   Potassium Latest Ref Range: 3.6 - 5.5 mmol/L 4.5   Chloride Latest Ref Range: 96 - 112 mmol/L 106   Co2 Latest Ref Range: 20 - 33 mmol/L 21   Anion Gap Latest Ref Range: 0.0 - 11.9  8.0   Glucose Latest Ref Range: 65 - 99 mg/dL 192 (H)   Bun Latest Ref Range: 8 - 22 mg/dL 14   Creatinine Latest Ref Range: 0.50 - 1.40 mg/dL 0.86   GFR If  Latest Ref Range: >60 mL/min/1.73 m 2 " >60   GFR If Non  Latest Ref Range: >60 mL/min/1.73 m 2 >60   Calcium Latest Ref Range: 8.5 - 10.5 mg/dL 8.3 (L)   Troponin I Latest Ref Range: 0.00 - 0.04 ng/mL 32.20 (H)       October 30, 2018: Transthoracic Echo Report  No prior study is available for comparison.   Normal left ventricular chamber size.  Mildly reduced left ventricular systolic function. Left ventricular ejection fraction is visually   estimated to be 45%. Wall motion is difficult to assess with the limitations of image quality. Consider contrast study to better evaluate for wall motion abnormality and LVEF.  Technically difficult study incomplete information is obtained    Assessment:     1. NSTEMI (non-ST elevated myocardial infarction) (HCC)  metoprolol (LOPRESSOR) 25 MG Tab    ticagrelor (BRILINTA) 90 MG Tab tablet   2. Coronary artery disease involving native coronary artery of native heart without angina pectoris     3. Status post insertion of drug eluting coronary artery stent     4. Mixed hyperlipidemia  atorvastatin (LIPITOR) 80 MG tablet    COMP METABOLIC PANEL    LIPID PANEL   5. Essential hypertension  losartan (COZAAR) 50 MG Tab   6. Elevated glucose         Medical Decision Making:  Today's Assessment / Status / Plan:     Coronary artery disease: Status post non-ST elevation MI.  He received 2 drug-eluting stents, RCA and LAD.  He has had no anginal symptoms but has not been very active he has been to cardiac rehab before evaluation and is annoyed that he would need to drive 3 days a week from [place] to do the program.  I have recommended that he go for a couple weeks and learn the level of exercise and then hopefully exercise independently.    If he has any chest discomfort or shortness of breath he will let us know.  He knows to take the Brilinta for 1 year and aspirin ongoing.  If Brilinta becomes too expensive we will change him to Plavix.    Hyperlipidemia: He is on a statin.  We will check lipids and  metabolic panel in 3 months.    Hypertension: His blood pressure is low.  I will have him take losartan in the evening and monitor his blood pressure.  If his blood pressures consistently less than 100 systolic then I would reduce the losartan to 25 mg.  I would like him to continue on metoprolol 12.5 mg twice a day for his coronary artery disease and for heart healing.    Elevated glucose: Apparently patient is on glipizide.  His wife will contact us with a current list of medications.    The patient tells me he takes a thyroid medication and also has sensitivities to several medicines such as metformin and Ambien none of which is listed in the chart.  His wife to please contact us either by phone or my chart with an update on medications, allergies and health problems.    The patient will follow-up in 3 months with Dr David Echols with lab work prior.  He will follow-up sooner if problems.    Collaborating Provider: Dr. Pozo.        No Recipients

## 2018-12-10 NOTE — PROGRESS NOTES
Chief Complaint   Patient presents with   • MI (Non ST Segment Elevation MI)     hospital follow up       Subjective:   Lv Mast is a 68 y.o. male who presents today with his wife, daughter, for hospital follow-up.  He had lifted a lawnmower and had some chest discomfort thinking it was muscular.  It persisted for a few days and did not have improvement with over-the-counter pain medications.  He was seen by his primary care who referred him to the emergency room in [place].  He was transferred to [facility] and underwent cardiac catheterization.    He was found to have obstructive disease in the right coronary artery and LAD.  He received synergy drug-eluting stents to these arteries.  He was started on aspirin and Brilinta.    He has a history of high cholesterol and hypertension.  And apparently he has hypothyroidism and is on thyroid medication.  Also he is on glipizide for elevated blood sugars but does not state that he is diabetic.  He does not know the names or dosages of his medications.    Gives a history of having been a  and was medically retired at age 42.    Since being on the hospital, he denies any chest discomfort, shortness of breath or palpitations.  When asked if he exercises he says he does as little as possible.    Past Medical History:   Diagnosis Date   • Anxiety    • GERD (gastroesophageal reflux disease)    • Hyperlipidemia    • Hypertension    • Hypothyroidism      Past Surgical History:   Procedure Laterality Date   • CARDIAC CATH  10/29/2018    Synergy stents to LAD , RCA   • ARTHROSCOPY, KNEE Left 06/1992   • CATARACT EXTRACTION WITH IOL       Family History   Problem Relation Age of Onset   • Arthritis Mother    • Diabetes Mother    • Hypertension Mother    • Hyperlipidemia Mother    • Stroke Mother    • Cancer Maternal Grandmother         breast cancer   • Stroke Maternal Grandfather    • Stroke Paternal Grandfather 40        CVA   • Lung  Disease Neg Hx    • Genetic Neg Hx    • Psychiatry Neg Hx    • Heart Disease Neg Hx    • Alcohol/Drug Neg Hx      Social History     Social History   • Marital status:      Spouse name: N/A   • Number of children: N/A   • Years of education: N/A     Occupational History   • Not on file.     Social History Main Topics   • Smoking status: Former Smoker     Types: Cigars     Quit date: 2008   • Smokeless tobacco: Never Used   • Alcohol use No   • Drug use: No   • Sexual activity: Not on file     Other Topics Concern   • Not on file     Social History Narrative   • No narrative on file     No Known Allergies  Outpatient Encounter Prescriptions as of 12/10/2018   Medication Sig Dispense Refill   • atorvastatin (LIPITOR) 80 MG tablet Take 1 Tab by mouth every evening. 90 Tab 3   • metoprolol (LOPRESSOR) 25 MG Tab Take 0.5 Tabs by mouth 2 Times a Day. 45 Tab 3   • ticagrelor (BRILINTA) 90 MG Tab tablet Take 1 Tab by mouth 2 Times a Day. 60 Tab 11   • losartan (COZAAR) 50 MG Tab Take 1 Tab by mouth every evening. 90 Tab 3   • aspirin EC (ECOTRIN) 81 MG Tablet Delayed Response Take 81 mg by mouth every day.     • LORazepam (ATIVAN) 1 MG Tab Take 1 mg by mouth 2 Times a Day.     • omeprazole (PRILOSEC) 20 MG delayed-release capsule Take 20 mg by mouth 2 times a day.     • diphenhydrAMINE (BENADRYL) 25 MG Tab Take 50 mg by mouth 1 time daily as needed for Sleep.     • acetaminophen (TYLENOL) 500 MG Tab Take 2,000 mg by mouth every evening as needed (Pain, Sleep).     • [DISCONTINUED] atorvastatin (LIPITOR) 80 MG tablet Take 1 Tab by mouth every day. 30 Tab 0   • [DISCONTINUED] metoprolol (LOPRESSOR) 25 MG Tab Take 0.5 Tabs by mouth 2 Times a Day. 30 Tab 0   • [DISCONTINUED] ticagrelor (BRILINTA) 90 MG Tab tablet Take 1 Tab by mouth 2 Times a Day. 60 Tab 0   • [DISCONTINUED] losartan (COZAAR) 50 MG Tab Take 50 mg by mouth every day.       No facility-administered encounter medications on file as of 12/10/2018.   "    Review of Systems   Constitutional: Negative for malaise/fatigue.   Respiratory: Negative for cough and shortness of breath.    Cardiovascular: Negative for chest pain, palpitations, orthopnea, claudication, leg swelling and PND.   Gastrointestinal: Negative for abdominal pain.   Musculoskeletal: Negative for myalgias.   Neurological: Negative for dizziness and weakness.        Objective:   BP (!) 96/52 (BP Location: Left arm, Patient Position: Sitting)   Pulse 72   Ht 1.854 m (6' 1\")   Wt 98.9 kg (218 lb)   SpO2 95%   BMI 28.76 kg/m²     Physical Exam   Constitutional: He is oriented to person, place, and time. He appears well-developed and well-nourished.   HENT:   Head: Normocephalic.   Eyes: EOM are normal.   Neck: Normal range of motion. No JVD present.   Cardiovascular: Normal rate and regular rhythm.  Exam reveals no friction rub.    No murmur heard.  Pulmonary/Chest: Effort normal.   Abdominal: Soft. Bowel sounds are normal.   Musculoskeletal: He exhibits no edema.   Neurological: He is alert and oriented to person, place, and time.   Skin: Skin is warm and dry.   Psychiatric: He has a normal mood and affect.   Angry and irritable affect.     Results for DEE GOSS (MRN [id number])    Ref. Range 10/31/2018 10:08   Sodium Latest Ref Range: 135 - 145 mmol/L 135   Potassium Latest Ref Range: 3.6 - 5.5 mmol/L 4.5   Chloride Latest Ref Range: 96 - 112 mmol/L 106   Co2 Latest Ref Range: 20 - 33 mmol/L 21   Anion Gap Latest Ref Range: 0.0 - 11.9  8.0   Glucose Latest Ref Range: 65 - 99 mg/dL 192 (H)   Bun Latest Ref Range: 8 - 22 mg/dL 14   Creatinine Latest Ref Range: 0.50 - 1.40 mg/dL 0.86   GFR If  Latest Ref Range: >60 mL/min/1.73 m 2 >60   GFR If Non  Latest Ref Range: >60 mL/min/1.73 m 2 >60   Calcium Latest Ref Range: 8.5 - 10.5 mg/dL 8.3 (L)   Troponin I Latest Ref Range: 0.00 - 0.04 ng/mL 32.20 (H)       October 30, 2018: Transthoracic Echo Report  No prior study is " available for comparison.   Normal left ventricular chamber size.  Mildly reduced left ventricular systolic function. Left ventricular ejection fraction is visually   estimated to be 45%. Wall motion is difficult to assess with the limitations of image quality. Consider contrast study to better evaluate for wall motion abnormality and LVEF.  Technically difficult study incomplete information is obtained    Assessment:     1. NSTEMI (non-ST elevated myocardial infarction) (HCC)  metoprolol (LOPRESSOR) 25 MG Tab    ticagrelor (BRILINTA) 90 MG Tab tablet   2. Coronary artery disease involving native coronary artery of native heart without angina pectoris     3. Status post insertion of drug eluting coronary artery stent     4. Mixed hyperlipidemia  atorvastatin (LIPITOR) 80 MG tablet    COMP METABOLIC PANEL    LIPID PANEL   5. Essential hypertension  losartan (COZAAR) 50 MG Tab   6. Elevated glucose         Medical Decision Making:  Today's Assessment / Status / Plan:     Coronary artery disease: Status post non-ST elevation MI.  He received 2 drug-eluting stents, RCA and LAD.  He has had no anginal symptoms but has not been very active he has been to cardiac rehab before evaluation and is annoyed that he would need to drive 3 days a week from [place] to do the program.  I have recommended that he go for a couple weeks and learn the level of exercise and then hopefully exercise independently.    If he has any chest discomfort or shortness of breath he will let us know.  He knows to take the Brilinta for 1 year and aspirin ongoing.  If Brilinta becomes too expensive we will change him to Plavix.    Hyperlipidemia: He is on a statin.  We will check lipids and metabolic panel in 3 months.    Hypertension: His blood pressure is low.  I will have him take losartan in the evening and monitor his blood pressure.  If his blood pressures consistently less than 100 systolic then I would reduce the losartan to 25 mg.  I would  like him to continue on metoprolol 12.5 mg twice a day for his coronary artery disease and for heart healing.    Elevated glucose: Apparently patient is on glipizide.  His wife will contact us with a current list of medications.    The patient tells me he takes a thyroid medication and also has sensitivities to several medicines such as metformin and Ambien none of which is listed in the chart.  His wife to please contact us either by phone or my chart with an update on medications, allergies and health problems.    The patient will follow-up in 3 months with Dr David Echols with lab work prior.  He will follow-up sooner if problems.    Collaborating Provider: Dr. Pozo.

## 2018-12-28 ENCOUNTER — NON-PROVIDER VISIT (OUTPATIENT)
Dept: CARDIOLOGY | Facility: MEDICAL CENTER | Age: 68
End: 2018-12-28
Payer: MEDICARE

## 2018-12-28 NOTE — PROGRESS NOTES
"  Cardiac Rehab Individual Treatment Plan Assessment: 30 day 12/28/18 Session #     1   MRN: [id number]   Allergies: Patient has no known allergies.   Patient Name: Lv Mast : 1950 Risk Stratification: High    Diagnoses: No diagnosis found. Age: 68 y.o. Physician: Fazal Car M.D.    Date of Event: 10/31/18 Specialist: Nain   Risk Factors:  Hypertension, Hyperlipidemia, Diabetes, Family History, Obesity, Stress, Sedentary Lifestyle, Age, Male > 45   Exercise Nutrition Education Psychosocial   Stages of change Stages of change Stages of change Stages of change   Contemplate Contemplate Contemplate Contemplate   Fitness Test Lipids Learning Barriers Outcome Survey Tools   DIST:    Available: No (not available) Learning Barriers: None     Max HR:   Date:  (na) Family Support     RPE:   Total:   Yes     SPO2:   Trig:   Lives: Spouse Intervention   MET:   HDL:   Tobacco Use     EF= 45 (10/30/2018) LDL:   History   Smoking Status   • Former Smoker   • Types: Cigars   • Quit date:    Smokeless Tobacco   • Never Used          Ambulatory Status Diabetes Smoking Intervention       Diabetes: Yes Smoking Cessation Referral: N/A       HbA1C:  (na) Date:  (na) Ind. Education / Counseling: N/A Education   Exercise Prescription Monitors BS at Home: Yes Tobacco Adjunct: N/A         Frequency:  (na) Random BS: 192 Education Intervention Target Goal   Frequency:    Weight Management       Duration:      Target Goal     Intensity:    Height: 185.4 cm (6' 0.99\") Complete Tobacco Cessation: Complete Tobacco Cessation: N/A     METS:      Educate / Review and have understanding of cardiac disease prevention:       Progression:    Goal weight: 200      THR:   History   Alcohol Use No         Angina with Exercise?         Resistance Training?         Hypertension      Diagnosed with HTN: Yes Intervention        Dietician Consult/Class: Pending (scheduled)      Peak Ex BP:  (N/A)       Intervention       Home Exercise:       "    Mode:   Attend Cooking School: Pending     Duration:   Dietary Goal: >=58 rate your plate, low sodium     Frequency:   Education     Education         Target Goal     Target Goal LDL-C < 100 if trig. > 200:  Yes       Start Individual Exercise Rx   LDL-C < 70 for high risk patient:  Yes       BP < 140/90 or < 130/80 if DM or CKD   Non HDL-C Should be < 130:  Yes       Aerobic activity 30 + min / day 5 days / wk   HbA1C < 7%: Yes         BMI < 25: Yes       Notes: Patient has not attended [facility] since date of orientation. Had a 2-week vacation planned in January. Due to begin sessions January 28, 2019.

## 2018-12-28 NOTE — PROGRESS NOTES
Patient not present for ITP today, so ITP review will be postponed until ITP performed.  Sincerely,  Brody James MD

## 2018-12-30 LAB — EKG IMPRESSION: NORMAL

## 2019-01-04 LAB
ALBUMIN SERPL-MCNC: 3.9 G/DL (ref 3.6–4.8)
ALBUMIN/GLOB SERPL: 1.4 {RATIO} (ref 1.2–2.2)
ALP SERPL-CCNC: 107 IU/L (ref 39–117)
ALT SERPL-CCNC: 73 IU/L (ref 0–44)
AMBIG ABBREV CMP14 DFLT   977206: NORMAL
AMBIG ABBREV LP  977212: NORMAL
AST SERPL-CCNC: 30 IU/L (ref 0–40)
BILIRUB SERPL-MCNC: 0.5 MG/DL (ref 0–1.2)
BUN SERPL-MCNC: 12 MG/DL (ref 8–27)
BUN/CREAT SERPL: 12 (ref 10–24)
CALCIUM SERPL-MCNC: 9.4 MG/DL (ref 8.6–10.2)
CHLORIDE SERPL-SCNC: 105 MMOL/L (ref 96–106)
CHOLEST SERPL-MCNC: 194 MG/DL (ref 100–199)
CO2 SERPL-SCNC: 25 MMOL/L (ref 20–29)
CREAT SERPL-MCNC: 1 MG/DL (ref 0.76–1.27)
GLOBULIN SER CALC-MCNC: 2.7 G/DL (ref 1.5–4.5)
GLUCOSE SERPL-MCNC: 116 MG/DL (ref 65–99)
HDLC SERPL-MCNC: 38 MG/DL
IF AFRICAN AMERICAN  100797: 90 ML/MIN/1.73
IF NON AFRICAN AMER 100791: 78 ML/MIN/1.73
LABORATORY COMMENT REPORT: ABNORMAL
LDLC SERPL CALC-MCNC: 117 MG/DL (ref 0–99)
POTASSIUM SERPL-SCNC: 4.4 MMOL/L (ref 3.5–5.2)
PROT SERPL-MCNC: 6.6 G/DL (ref 6–8.5)
SODIUM SERPL-SCNC: 144 MMOL/L (ref 134–144)
TRIGL SERPL-MCNC: 193 MG/DL (ref 0–149)
VLDLC SERPL CALC-MCNC: 39 MG/DL (ref 5–40)

## 2019-01-08 ENCOUNTER — TELEPHONE (OUTPATIENT)
Dept: CARDIOLOGY | Facility: MEDICAL CENTER | Age: 69
End: 2019-01-08

## 2019-01-08 DIAGNOSIS — I10 ESSENTIAL HYPERTENSION: ICD-10-CM

## 2019-01-08 DIAGNOSIS — E78.2 MIXED HYPERLIPIDEMIA: ICD-10-CM

## 2019-01-08 NOTE — TELEPHONE ENCOUNTER
COMP METABOLIC PANEL   Order: [id number]   Status:  Final result   Visible to patient:  Yes (MyChart)   Notes recorded by ELDA Aguillon on 1/7/2019 at 11:34 AM PST  His labs have been done a week before his follow-up appointment in March with Dr. Echols.  Please reorder labs to be done then.  His LDL goal is 70 or less.  ------     CMP and Lipid ordered.    Attempted to call patient on home phone, unable to reach.  Unable to leave voicemail.  Letter printed with APN recommendations.      Letter and lab orders sent to patient's mailing address.

## 2019-01-08 NOTE — LETTER
January 8, 2019        Lv Mast  [address]  [facility] [address]          Dear Lv,    We have received the results of your recent:    Lipid and Comprehensive Metabolic Panel.    Lola reviewed your recent lab results and is recommending for your LDL goal to be 70 or less, your LDL is currently 117.   I have attached a new set of labs to be taken.  Please have them done about a week prior to your follow up visit with Dr. Echols.    Feel free to call us with any questions.    Sincerely,          Lora BAKER For RONIT Reid  Electronically Signed

## 2019-01-18 ENCOUNTER — NON-PROVIDER VISIT (OUTPATIENT)
Dept: CARDIOLOGY | Facility: MEDICAL CENTER | Age: 69
End: 2019-01-18

## 2019-01-18 ASSESSMENT — PATIENT HEALTH QUESTIONNAIRE - PHQ9: SUM OF ALL RESPONSES TO PHQ QUESTIONS 1-9: 6

## 2019-01-18 NOTE — PROGRESS NOTES
Cardiac Rehab Individual Treatment Plan Assessment: 60 day 19 Session #     1   MRN: [id number]   Allergies: Patient has no known allergies.   Patient Name: Lv Mast : 1950 Risk Stratification: High    Diagnoses: No diagnosis found. Age: 68 y.o. Physician: Fazal Car M.D.    Date of Event: 10/31/18 Specialist: Nain   Risk Factors:  Hypertension, Hyperlipidemia, Diabetes, Family History, Obesity, Stress, Sedentary Lifestyle, Age, Male > 45   Exercise Nutrition Education Psychosocial   Stages of change Stages of change Stages of change Stages of change   Contemplate Contemplate Contemplate Contemplate   Fitness Test Lipids Learning Barriers Outcome Survey Tools   DIST: 1152  Available: No (not available) Learning Barriers: None FP QOL Overall Score: 15.3   Max HR: 95 Date:  (na) Family Support PHQ-9: 6   RPE: 8 Total:   Yes Nutrition Screen: 40 %   SPO2: 95 % Trig:   Lives: Spouse Intervention   MET: 2.69 HDL:   Tobacco Use Behavioral Health Consult: Yes   EF= 45 (10/30/2018) LDL:   History   Smoking Status   • Former Smoker   • Types: Cigars   • Quit date:    Smokeless Tobacco   • Never Used      Physician Referral: Yes   Ambulatory Status Diabetes Smoking Intervention Identifies Stressors: Yes   Fall Risk Assessed: Yes Diabetes: Yes Smoking Cessation Referral: N/A Drug Intervention: Yes   Exercise Ambulatory Status Assist Devices: None HbA1C:  (na) Date:  (na) Ind. Education / Counseling: N/A Education   Exercise Prescription Monitors BS at Home: Yes Tobacco Adjunct: N/A Psychosocial Education: Coping Techniques, Positive Support System, S/S Depression   Mode: Treadmill, NuStep, UBE, Airdyne   Frequency:  (na) Random BS: 192 Education Intervention Target Goal   Frequency:  3 days/week Weight Management Healthy Heart Education: Class Schedule Given, Medications Reviewed, Patient Education Binder Provided, Risk Factors Discussed, Videos Viewed per Neda Assess presence or absence of  "depression using a valid screening: Yes   Duration:  20-30 Weight:  (N/A) Target Goal Use Stress Management: Yes   Intensity:  11-13 RPE Height: 185.4 cm (6' 0.99\") Complete Tobacco Cessation: Complete Tobacco Cessation: N/A Adverse Events: Yes   METS:  1.0-3.0   Educate / Review and have understanding of cardiac disease prevention: Educate / Review and have understanding of cardiac disease prevention: Yes Unexpected Events: Yes   Progression:  ^ increments of 1-3 to THR/RPE as tolerated Goal weight: 200 Medication Compliance: Yes    THR: THR: Other (see comment) History   Alcohol Use No         Angina with Exercise?  Angina with Exercise: No      Resistance Training?  Resistance Training: Yes      Hypertension      Diagnosed with HTN: Yes Intervention      Resting BP:  (N/A) Dietician Consult/Class: Pending (scheduled)      Peak Ex BP:  (N/A) Nurse/Patient Discussion: No     Intervention Diabetes Ed Referral: Yes     Home Exercise:  Yes Discuss Maintenance /Wt Loss: N/A     Mode: Walk Attend blogfoster School: Pending     Duration: 10 minutes 3 times daily Dietary Goal: >=58 rate your plate, low sodium     Frequency: 7 days active  Education     Education Nutrition Education: S&S Hypo/Hyper glycemia, Relate Diabetes to CAD, Healthy Eating, Sodium Reduction     Equipment Orientation, Exercise Safety, S/S to Report, RPE Scale, Warm Up / Cool Down, Physically Active Target Goal     Target Goal LDL-C < 100 if trig. > 200:  Yes       Start Individual Exercise Rx Yes LDL-C < 70 for high risk patient:  Yes       BP < 140/90 or < 130/80 if DM or CKD Yes Non HDL-C Should be < 130:  Yes       Aerobic activity 30 + min / day 5 days / wk Yes HbA1C < 7%: Yes         BMI < 25: Yes       Notes: Patient has not attended [facility] since date of orientation. Had a 2-week vacation planned in January. Due to begin sessions January 28, 2019.                               "

## 2019-01-28 ENCOUNTER — NON-PROVIDER VISIT (OUTPATIENT)
Dept: CARDIOLOGY | Facility: MEDICAL CENTER | Age: 69
End: 2019-01-28
Payer: MEDICARE

## 2019-01-28 DIAGNOSIS — Z95.5 STENTED CORONARY ARTERY: ICD-10-CM

## 2019-01-28 LAB — EKG IMPRESSION: NORMAL

## 2019-01-28 PROCEDURE — G0423 INTENS CARDIAC REHAB NO EXER: HCPCS | Mod: 59 | Performed by: FAMILY MEDICINE

## 2019-01-28 PROCEDURE — G0422 INTENS CARDIAC REHAB W/EXERC: HCPCS | Performed by: FAMILY MEDICINE

## 2019-01-29 NOTE — PROGRESS NOTES
Lv Mast attended the following workshop from 2:30-3:30pm.   Workshop Title: Targeting Your Nutrition Priorities.       Lecture was attended and patient questions addressed. The patient will continue workshops and nutrition education.

## 2019-01-30 ENCOUNTER — NON-PROVIDER VISIT (OUTPATIENT)
Dept: CARDIOLOGY | Facility: MEDICAL CENTER | Age: 69
End: 2019-01-30
Payer: MEDICARE

## 2019-01-30 DIAGNOSIS — Z95.5 STENTED CORONARY ARTERY: ICD-10-CM

## 2019-01-30 LAB — EKG IMPRESSION: NORMAL

## 2019-01-30 PROCEDURE — G0422 INTENS CARDIAC REHAB W/EXERC: HCPCS | Performed by: FAMILY MEDICINE

## 2019-01-30 PROCEDURE — G0423 INTENS CARDIAC REHAB NO EXER: HCPCS | Mod: 59 | Performed by: FAMILY MEDICINE

## 2019-01-30 NOTE — PROGRESS NOTES
Lv Mast attended: Exercise Workshop from 2:30-3:30pm.       The topic was: Resistance Training and Core Strength.     Patient received handouts regarding the specific exercise information

## 2019-02-01 ENCOUNTER — NON-PROVIDER VISIT (OUTPATIENT)
Dept: CARDIOLOGY | Facility: MEDICAL CENTER | Age: 69
End: 2019-02-01
Payer: MEDICARE

## 2019-02-01 DIAGNOSIS — Z95.5 STENTED CORONARY ARTERY: ICD-10-CM

## 2019-02-01 LAB — EKG IMPRESSION: NORMAL

## 2019-02-01 PROCEDURE — G0423 INTENS CARDIAC REHAB NO EXER: HCPCS | Mod: 59 | Performed by: FAMILY MEDICINE

## 2019-02-01 PROCEDURE — G0422 INTENS CARDIAC REHAB W/EXERC: HCPCS | Performed by: FAMILY MEDICINE

## 2019-02-02 NOTE — PROGRESS NOTES
Lv Mast attended: cooking school from  3:30-4:30pm.   Today  prepared Horseradish Balsamic Dressing and Chopped Salad.    Patient received handouts and nutrition information regarding the specific recipes.

## 2019-02-06 ENCOUNTER — NON-PROVIDER VISIT (OUTPATIENT)
Dept: CARDIOLOGY | Facility: MEDICAL CENTER | Age: 69
End: 2019-02-06
Payer: MEDICARE

## 2019-02-06 DIAGNOSIS — Z95.5 STENTED CORONARY ARTERY: ICD-10-CM

## 2019-02-06 LAB — EKG IMPRESSION: NORMAL

## 2019-02-06 PROCEDURE — G0422 INTENS CARDIAC REHAB W/EXERC: HCPCS | Performed by: FAMILY MEDICINE

## 2019-02-06 PROCEDURE — G0423 INTENS CARDIAC REHAB NO EXER: HCPCS | Mod: 59 | Performed by: FAMILY MEDICINE

## 2019-02-07 NOTE — PROGRESS NOTES
Lv Mast attended: cooking school from 3:30-4:30pm.   Today  prepared Foss Stew.    Patient received handouts and nutrition information regarding the specific recipes.

## 2019-02-07 NOTE — PROGRESS NOTES
Cardiac Rehab Individual Treatment Plan Assessment: 90 day 02/06/19 Session #     5   MRN: [id number]   Allergies: Patient has no known allergies.   Patient Name: Lv Mast : 1950 Risk Stratification: High    Diagnoses:   1. Stented coronary artery     Age: 68 y.o. Physician: Fazal Car M.D.    Date of Event: 10/31/18 Specialist: Nain   Risk Factors:  Hypertension, Hyperlipidemia, Diabetes, Family History, Obesity, Stress, Sedentary Lifestyle, Age, Male > 45   Exercise Nutrition Education Psychosocial   Stages of change Stages of change Stages of change Stages of change   Contemplate Contemplate Contemplate Contemplate   Fitness Test Lipids Learning Barriers Outcome Survey Tools   DIST:  (assessed pre- and post-program)  Available: Yes Learning Barriers: Vision, Ready to Learn FP QOL Overall Score:  (assessed pre- and post-program)   Max HR:  (assessed pre- and post-program) Date: 19 Family Support PHQ-9:  (assessed pre- and post-program)   RPE:  (assessed pre- and post-program) Total: 194 mg/dL Yes Nutrition Screen:  (assessed pre- and post-program)   SPO2:  (assessed pre- and post-program) Tri mg/dL Lives: Spouse Intervention   MET:  (assessed pre- and post-program) HDL: 38 mg/dL Tobacco Use Behavioral Health Consult: Yes   EF= 45 (10/30/2018) LDL: 117 mg/dL History   Smoking Status   • Former Smoker   • Types: Cigars   • Quit date:    Smokeless Tobacco   • Never Used      Physician Referral: Yes   Ambulatory Status Diabetes Smoking Intervention Identifies Stressors: Yes   Fall Risk Assessed: Yes Diabetes: No Smoking Cessation Referral: N/A Drug Intervention: Yes   Exercise Ambulatory Status Assist Devices: None HbA1C:  (na) Date:  (na) Ind. Education / Counseling: N/A Education   Exercise Prescription Monitors BS at Home: No Tobacco Adjunct: N/A Psychosocial Education: Coping Techniques, Positive Support System, S/S Depression   Mode: Treadmill, NuStep, UBE, Airdyne   Frequency:   "(na) Random BS: 116 (1/3/2019) Education Intervention Target Goal   Frequency:  3 days/week Weight Management Healthy Heart Education: Class Schedule Given, Cooking School Attended, Medications Reviewed, Patient Education Binder Provided, Risk Factors Discussed, Videos Viewed per Neda, Workshops Attended Assess presence or absence of depression using a valid screening: Yes   Duration:  20-30 Weight: 100.2 kg (221 lb) Target Goal Use Stress Management: Yes   Intensity:  11-13 RPE Height: 185.4 cm (6' 0.99\") Complete Tobacco Cessation: Complete Tobacco Cessation: N/A Adverse Events: No   METS:  1.0-3.0 BMI (Calculated): 29.16 Educate / Review and have understanding of cardiac disease prevention: Educate / Review and have understanding of cardiac disease prevention: Yes Unexpected Events: No   Progression:  ^ increments of 1-3 to THR/RPE as tolerated Goal weight: 200 Medication Compliance: Yes    THR: THR: Other (see comment) () History   Alcohol Use No         Angina with Exercise?  Angina with Exercise: No      Resistance Training?  Resistance Training: Yes      Hypertension      Diagnosed with HTN: Yes Intervention      Resting BP: 118/63 Dietician Consult/Class: Pending (scheduled)      Peak Ex BP:  (assessed pre- and post-program) Nurse/Patient Discussion: Yes     Intervention Diabetes Ed Referral: No     Home Exercise:  Yes Discuss Maintenance /Wt Loss: Yes     Mode: Walk Attend Cooking School: Yes     Duration: 10 minutes 3 times daily Dietary Goal: >=58 rate your plate, low sodium     Frequency: 7 days active  Education     Education Nutrition Education: S&S Hypo/Hyper glycemia, Relate Diabetes to CAD, Healthy Eating, Sodium Reduction     Equipment Orientation, Exercise Safety, S/S to Report, RPE Scale, Warm Up / Cool Down, Physically Active Target Goal     Target Goal LDL-C < 100 if trig. > 200:  Yes       Start Individual Exercise Rx Yes LDL-C < 70 for high risk patient:  Yes       BP < 140/90 or < " "130/80 if DM or CKD Yes Non HDL-C Should be < 130:  Yes       Aerobic activity 30 + min / day 5 days / wk Yes HbA1C < 7%: Yes         BMI < 25: Yes       Notes: Exercise: Current: Lv is completing 3x7 minute aerobic exercise sessions in [facility], plus an Initial weight routine using 5lb dumbbells. His exercise is limited by knee pain as he needs a total knee replacement. We are tailoring his exercises to maintain and build lower body strength without pain. He states he is a \"couch potato\" and does not exercise at home and has no interest in adopting an exercise routine. He feels his strength is good and would like to increase his endurance. Goal: Lv has a [employer] watch and is counting his steps. He does not set any firm step goal for each day but he will consider taking one 10-minute walk indoors or outdoors each day. Progress: Lv is resistant to exercise, but is interested in learning his [employer] watch and tracking his steps. Nutrition: Current: Lv and his wife have switched from white to wheat bread. He states he has been eating a low-sodium diet for years. He enjoys cantaloupe and watermelon that is pre-cut from the store. He does not drink or smoke. He does not drink coffee or tea but drinks regular coke each day and states, \"don't even try to get me to quit.\" He loves red meat and eating out but he states his wife recently retired and now cooks breakfast so it is one less meal at a restaurant. Goal: To decrease the portion size and frequency of red meat. To meet with TAWANNA Deras and attend Nutrition Workshops and Cooking School. Progress: Lv is making small changes to his diet to make it more heart-healthy. 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 Issues with his vision caused him to close his business and the heart attack followed. He lives with his wife who just retired two weeks ago and he states they are enjoying having more time together. He worked night shift for " years and states that he stays up all night and has issues falling asleep because his mind is racing. He takes lorazepam for anxiety. Goal: To attend Healthy Mindset workshops.

## 2019-02-08 ENCOUNTER — NON-PROVIDER VISIT (OUTPATIENT)
Dept: CARDIOLOGY | Facility: MEDICAL CENTER | Age: 69
End: 2019-02-08
Payer: MEDICARE

## 2019-02-08 DIAGNOSIS — Z95.5 STENTED CORONARY ARTERY: ICD-10-CM

## 2019-02-08 LAB — EKG IMPRESSION: NORMAL

## 2019-02-08 PROCEDURE — G0423 INTENS CARDIAC REHAB NO EXER: HCPCS | Mod: 59 | Performed by: FAMILY MEDICINE

## 2019-02-08 PROCEDURE — G0422 INTENS CARDIAC REHAB W/EXERC: HCPCS | Performed by: FAMILY MEDICINE

## 2019-03-01 ENCOUNTER — NON-PROVIDER VISIT (OUTPATIENT)
Dept: CARDIOLOGY | Facility: MEDICAL CENTER | Age: 69
End: 2019-03-01
Payer: MEDICARE

## 2019-03-01 ENCOUNTER — NON-PROVIDER VISIT (OUTPATIENT)
Dept: CARDIOLOGY | Facility: MEDICAL CENTER | Age: 69
End: 2019-03-01

## 2019-03-01 DIAGNOSIS — Z95.5 STENTED CORONARY ARTERY: ICD-10-CM

## 2019-03-01 PROCEDURE — G0422 INTENS CARDIAC REHAB W/EXERC: HCPCS | Performed by: FAMILY MEDICINE

## 2019-03-01 PROCEDURE — G0423 INTENS CARDIAC REHAB NO EXER: HCPCS | Mod: 59 | Performed by: FAMILY MEDICINE

## 2019-03-01 NOTE — PROGRESS NOTES
Nutrition Consult Note:    Augusta Jones  Date & Time note created:    3/1/2019   3:49 PM     Referring MD:   No ref. provider found  Time: 3:40-4:45pm     Patient ID:   Name:             Lv Mast     YOB: 1950  Age:                 69 y.o.  male   MRN:               [id number]      Lv Mast is here today for Intensive Cardiac Rehab.  This program includes Nutrition education and counseling following the Pritikin guidelines, which promote whole foods-fruits, vegetables, beans/legumes, whole grains, lean animal protein and not fat dairy-while avoiding added salt, refined/salty/sugary/fatty processed foods, trans/saturated fat, added sugar, tropical oils and heavy use of added oils.     Past Medical History:   Past Medical History:   Diagnosis Date   • Anxiety    • GERD (gastroesophageal reflux disease)    • Hyperlipidemia    • Hypertension    • Hypothyroidism        Past Surgical History:  Past Surgical History:   Procedure Laterality Date   • CARDIAC CATH  10/29/2018    Synergy stents to LAD , RCA   • ARTHROSCOPY, KNEE Left 06/1992   • CATARACT EXTRACTION WITH IOL         Current Outpatient Medications:  Current Outpatient Prescriptions   Medication Sig Dispense Refill   • atorvastatin (LIPITOR) 80 MG tablet Take 1 Tab by mouth every evening. 90 Tab 3   • metoprolol (LOPRESSOR) 25 MG Tab Take 0.5 Tabs by mouth 2 Times a Day. 45 Tab 3   • ticagrelor (BRILINTA) 90 MG Tab tablet Take 1 Tab by mouth 2 Times a Day. 60 Tab 11   • losartan (COZAAR) 50 MG Tab Take 1 Tab by mouth every evening. 90 Tab 3   • aspirin EC (ECOTRIN) 81 MG Tablet Delayed Response Take 81 mg by mouth every day.     • LORazepam (ATIVAN) 1 MG Tab Take 1 mg by mouth 2 Times a Day.     • omeprazole (PRILOSEC) 20 MG delayed-release capsule Take 20 mg by mouth 2 times a day.     • diphenhydrAMINE (BENADRYL) 25 MG Tab Take 50 mg by mouth 1 time daily as needed for Sleep.     • acetaminophen (TYLENOL) 500 MG Tab Take 2,000 mg by  "mouth every evening as needed (Pain, Sleep).       No current facility-administered medications for this visit.          Allergies:  No Known Allergies    Family History:  Family History   Problem Relation Age of Onset   • Arthritis Mother    • Diabetes Mother    • Hypertension Mother    • Hyperlipidemia Mother    • Stroke Mother    • Cancer Maternal Grandmother         breast cancer   • Stroke Maternal Grandfather    • Stroke Paternal Grandfather 40        CVA   • Lung Disease Neg Hx    • Genetic Neg Hx    • Psychiatry Neg Hx    • Heart Disease Neg Hx    • Alcohol/Drug Neg Hx        Social History:  Social History     Social History   • Marital status:      Spouse name: N/A   • Number of children: N/A   • Years of education: N/A     Occupational History   • Not on file.     Social History Main Topics   • Smoking status: Former Smoker     Types: Cigars     Quit date: 2008   • Smokeless tobacco: Never Used   • Alcohol use No   • Drug use: No   • Sexual activity: Not on file     Other Topics Concern   • Not on file     Social History Narrative   • No narrative on file         Food Log    Day 1    Brunch: Cantaloupe, French toast white bread, low sugar syrup and butter (out to eat), 2 slices of tubbs and 2 sausages   Dinner:  Chicken, baked beans    Anthropometrics:    Height: 73\"     Initial Program Weight: 214 lbs     Current Weight: 220 lbs     Ideal Body Weight Range: 184 lbs +/- 10%     5% weight loss: 203.3 lbs     10% Weight loss 192.6 lbs     Current Exercise Minutes:    Monday: 15 ICR  Tuesday:  Wednesday: 15 ICR  Thursday:  Friday: 15 ICR  Saturday:  Sunday     Goal: 300 minutes/week       Current BP: 146/60    Current Lipids:    Lab Results   Component Value Date/Time    CHOLSTRLTOT 194 01/03/2019 10:41 AM     (H) 01/03/2019 10:41 AM    HDL 38 (L) 01/03/2019 10:41 AM    TRIGLYCERIDE 193 (H) 01/03/2019 10:41 AM       Lab Results   Component Value Date/Time    SODIUM 144 01/03/2019 10:41 AM    " SODIUM 135 10/31/2018 10:08 AM    POTASSIUM 4.4 01/03/2019 10:41 AM    POTASSIUM 4.5 10/31/2018 10:08 AM    CHLORIDE 105 01/03/2019 10:41 AM    CHLORIDE 106 10/31/2018 10:08 AM    CO2 25 01/03/2019 10:41 AM    CO2 21 10/31/2018 10:08 AM    GLUCOSE 116 (H) 01/03/2019 10:41 AM    GLUCOSE 192 (H) 10/31/2018 10:08 AM    BUN 12 01/03/2019 10:41 AM    BUN 14 10/31/2018 10:08 AM    CREATININE 1.00 01/03/2019 10:41 AM    CREATININE 0.86 10/31/2018 10:08 AM    BUNCREATRAT 12 01/03/2019 10:41 AM     Lab Results   Component Value Date/Time    ALKPHOSPHAT 107 01/03/2019 10:41 AM    ASTSGOT 30 01/03/2019 10:41 AM    ALTSGPT 73 (H) 01/03/2019 10:41 AM    TBILIRUBIN 0.5 01/03/2019 10:41 AM        Goal:     Lipids  Goal   Total <200   Triglycerides <150   HDL 40 Men/50 Women   LDL <100       Positive Changes Since Beginning the Program:   1. Pt feels like he is in a positive environment       Nutrition Goals:  1. Gradual weight loss closer to IBWR  2. Decrease eating out   3. Slow down rate of eating     Exercise Goals:  1. 30 minutes in ICR   2. Home exercise per EP     Blood Pressure Goals:  1. Sleep adequacy   2. Decrease eating out     Lipid Goals:  1. TG <150   2. HDL >40  3. LDL >100     Barriers/Biggest Struggles:  1. Pt eats out a lot     Other Interventions:  Requested HbA1c from PCP    Educational Handouts:  Alternatives List  Protein content of foods  Cholesterol content of foods   14 day meal plan example  Sleep hygiene

## 2019-03-02 NOTE — PROGRESS NOTES
Individualized Treatment Plan   Cardiac Rehab Individual Treatment Plan  Initial/Reassessment/Discharge Assessment/ ReAssessment/ Discharge: 120 Day 19 Session #     7   MRN: [id number] Allergies: Patient has no known allergies.   Patient Name: Lv Mast : 1950 Risk Stratification: High    Diagnoses:   1. Stented coronary artery     Age: 69 y.o. Physician: Fazal Car M.D.    Date of Event: 10/31/18 Specialist: Nain   Risk Factors:  Hypertension, Hyperlipidemia, Diabetes, Family History, Obesity, Stress, Sedentary Lifestyle, Age, Male > 45   Exercise Nutrition Other Core Components/ Risk Factors Psychosocial   Stages of change Stages of change Stages of change Stages of change   Contemplate Contemplate Contemplate Contemplate   Fitness Test Lipids Learning Barriers Plan   DASI:  (na) Available: Yes Learning Barriers: Vision, Ready to Learn     DIST:  (assessed pre- and post-program) Date: 19 Family Support Intervention   Max HR:  (assessed pre- and post-program) Total: 194 mg/dL Yes Behavioral Health Consult: Yes   RPE:  (assessed pre- and post-program) Tri mg/dL Lives: Spouse Physician Referral: Yes   SPO2:  (assessed pre- and post-program) HDL: 38 mg/dL Other Risk Factors Plan Identifies Stressors: Yes   MET:  (assessed pre- and post-program) LDL: 117 mg/dL Other Risk Factors/Plan: Yes Drug Intervention: Yes (lorazepam for anxiety)   EF= 45 (10/30/2018) Diabetes Tobacco Use Outcome Survey Tools   Ambulatory Status Diabetes: No History   Smoking Status   • Former Smoker   • Types: Cigars   • Quit date:    Smokeless Tobacco   • Never Used    FP QOL Overall Score:  (assessed pre- and post-program)   Fall Risk Assessed: Yes HbA1C:  (na) Date:  (na) Smoking Intervention PHQ-9:  (assessed pre- and post-program)   Exercise Ambulatory Status Assist Devices: None Monitors BS at Home: No Smoking Cessation Referral: N/A Nutrition Screen:  (assessed pre- and post-program)   Intervention  "Frequency:  (na) Random BS: 116 (1/3/2019) Ind. Education / Counseling: N/A Education   Intervention: Yes        Exercise Prescription/ Exercise Plan       Mode: Treadmill, NuStep, UBE, Airdyne Weight Management Tobacco Adjunct: N/A Psychosocial Education: Coping Techniques, Positive Support System, S/S Depression   Frequency: 3 days/week Weight: 100.7 kg (222 lb) Target Goal Target Goal   Duration: 30-45 Height: 185.4 cm (6' 0.99\") Complete Tobacco Cessation: Assess presence or absence of depression using a valid screening: Yes   Intensity: 11-13 RPE BMI (Calculated): 29.3 Complete Tobacco Cessation: N/A Use Stress Management: Yes   METS: 1.0-3.0 Goal weight: 200 Intervention Adverse Events: No   Progression: ^ increments of 1-3 to THR/RPE as tolerated Waist:  (assessed pre- and post-program) Healthy Heart Education: Class Schedule Given, Cooking School Attended, Medications Reviewed, Patient Education Binder Provided, Risk Factors Discussed, Videos Viewed per Neda, Workshops Attended Unexpected Events: No      Goals     THR: THR: Other (see comment) () History   Alcohol Use No    Educate / Review and have understanding of cardiac disease prevention:    Angina with Exercise? Angina with Exercise: No Nutrition Plan Educate / Review and have understanding of cardiac disease prevention: Yes      Nutrition Plan: Yes Medication Compliance: Yes    Resistance Training? Resistance Training: Yes Intervention Hypertension Intervention      Dietician Consult/Class: Pending (scheduled)     Goals Nurse/Patient Discussion: Yes Weekly Lectures/ Videos/ Interactive cooking school: Yes    Home Exercise: Yes Diabetes Ed Referral: No Hypertension Management    Mode: Walk Discuss Maintenance /Wt Loss: Yes Resting BP: 142/60    Duration: 10 minutes 3 times daily Attend Cooking School: Yes     Frequency: 7 days active  Dietary Goal: >=58 rate your plate, low sodium    Education Education    Yes Nutrition Education: Healthy " "Eating, Sodium Reduction     Equipment Orientation, Exercise Safety, S/S to Report, RPE Scale, Warm Up / Cool Down, Physically Active Target Goal    Target Goal LDL-C < 100 if trig. > 200:  No    Start Individual Exercise Rx Yes LDL-C < 70 for high risk patient:  Yes    BP < 140/90 or < 130/80 if DM or CKD Yes Non HDL-C Should be < 130:  Yes    Aerobic activity 30 + min / day 5 days / wk Yes HbA1C < 7%: N/A      BMI < 25: Yes         Other     Notes: Lv returned today after being absent for a month due to poor weather. Exercise: Current: Lv is completing 3x10 minute aerobic exercise sessions in [facility], plus an Initial weight routine using 6lb dumbbells. His exercise is limited by knee pain as he needs a total knee replacement. We are tailoring his exercises to maintain and build lower body strength without pain. He states he is a \"couch potato\" and does not exercise at home and has no interest in adopting an exercise routine. He feels his strength is good and would like to increase his endurance. Goal: Lv has a Egully watch and is inconsistently counting his steps day-to-day. He states his exercise is heavily impacted right now by the snow. His walks are limited to walking around the grocery store at this time. Progress: Lv is resistant to exercise, but was able to increase the duration of his cardio sessions today and increase his dumbbell weight in his weight routine. Nutrition: Current: Lv and his wife have switched from white to wheat bread. He states he has been eating a low-sodium diet for years. He enjoys cantaloupe and watermelon that is pre-cut from the store. He does not drink or smoke. He does not drink coffee or tea but drinks regular coke each day and states, \"don't even try to get me to quit.\" He loves red meat and eating out but he states his wife recently retired and now cooks breakfast so it is one less meal at a restaurant. Goal: To decrease the portion size and frequency of red meat. To " meet with TAWANNA Deras and attend Nutrition Workshops and Cooking School. Progress: Lv is making small changes to his diet to make it more heart-healthy. Stress: Current: Lv had a high-stress job as a . He and his wife are now retired. He worked night shift for years and states that he stays up all night and has issues falling asleep because his mind is racing. He takes lorazepam for anxiety. Goal: To attend Healthy Mindset workshops.

## 2019-03-04 ENCOUNTER — TELEPHONE (OUTPATIENT)
Dept: CARDIOLOGY | Facility: MEDICAL CENTER | Age: 69
End: 2019-03-04

## 2019-03-04 DIAGNOSIS — R73.09 ELEVATED GLUCOSE: ICD-10-CM

## 2019-03-04 NOTE — TELEPHONE ENCOUNTER
Hemoglobin A1C ordered.    Called patient with updates.  He verbalizes understanding and is requesting for Lab slip to be mailed to patient mailing address.  Verified mailing address.  He states no other concerns or questions at this time and is appreciative of information given.    Lab slip printed and mailed to patient mailing address.    --------  A1c   Received: Today Message Contents   ELDA Aguillon R.N.          Please go ahead and order hemoglobin A1c on this patient. Thx      ----- Message -----   From: Augusta Jones R.D.   Sent: 3/1/2019   4:08 PM   To: ELDA Aguillon     Can we please have an HbA1c for this patient? Thanks so much! :)

## 2019-03-11 ENCOUNTER — NON-PROVIDER VISIT (OUTPATIENT)
Dept: CARDIOLOGY | Facility: MEDICAL CENTER | Age: 69
End: 2019-03-11
Payer: MEDICARE

## 2019-03-11 DIAGNOSIS — Z95.5 STENTED CORONARY ARTERY: ICD-10-CM

## 2019-03-11 PROCEDURE — G0422 INTENS CARDIAC REHAB W/EXERC: HCPCS | Performed by: FAMILY MEDICINE

## 2019-03-11 PROCEDURE — G0423 INTENS CARDIAC REHAB NO EXER: HCPCS | Mod: 59 | Performed by: FAMILY MEDICINE

## 2019-03-11 NOTE — PROGRESS NOTES
Lv Mast attended: Healthy Mindset Workshop from Healthy Mindset Workshop from [id number]  The topic was: Stress and Health    Patient received handouts regarding the specific information

## 2019-03-13 ENCOUNTER — NON-PROVIDER VISIT (OUTPATIENT)
Dept: CARDIOLOGY | Facility: MEDICAL CENTER | Age: 69
End: 2019-03-13
Payer: MEDICARE

## 2019-03-13 DIAGNOSIS — Z95.5 STENTED CORONARY ARTERY: ICD-10-CM

## 2019-03-13 LAB — EKG IMPRESSION: NORMAL

## 2019-03-13 PROCEDURE — G0422 INTENS CARDIAC REHAB W/EXERC: HCPCS | Performed by: FAMILY MEDICINE

## 2019-03-13 PROCEDURE — G0423 INTENS CARDIAC REHAB NO EXER: HCPCS | Mod: 59 | Performed by: FAMILY MEDICINE

## 2019-03-13 NOTE — PROGRESS NOTES
Lv Mast attended: Cooking School Workshop from 3:30-4:30PM and veggie fajitas were prepared.   Patient received handouts and nutrition information regarding the specific recipes.

## 2019-03-15 ENCOUNTER — NON-PROVIDER VISIT (OUTPATIENT)
Dept: CARDIOLOGY | Facility: MEDICAL CENTER | Age: 69
End: 2019-03-15
Payer: MEDICARE

## 2019-03-15 DIAGNOSIS — Z95.5 STENTED CORONARY ARTERY: ICD-10-CM

## 2019-03-15 LAB — EKG IMPRESSION: NORMAL

## 2019-03-15 PROCEDURE — G0422 INTENS CARDIAC REHAB W/EXERC: HCPCS | Performed by: FAMILY MEDICINE

## 2019-03-15 PROCEDURE — G0423 INTENS CARDIAC REHAB NO EXER: HCPCS | Mod: 59 | Performed by: FAMILY MEDICINE

## 2019-03-18 ENCOUNTER — NON-PROVIDER VISIT (OUTPATIENT)
Dept: CARDIOLOGY | Facility: MEDICAL CENTER | Age: 69
End: 2019-03-18
Payer: MEDICARE

## 2019-03-18 DIAGNOSIS — Z95.5 STENTED CORONARY ARTERY: ICD-10-CM

## 2019-03-18 LAB — EKG IMPRESSION: NORMAL

## 2019-03-18 PROCEDURE — G0422 INTENS CARDIAC REHAB W/EXERC: HCPCS | Performed by: FAMILY MEDICINE

## 2019-03-18 PROCEDURE — G0423 INTENS CARDIAC REHAB NO EXER: HCPCS | Mod: 59 | Performed by: FAMILY MEDICINE

## 2019-03-18 NOTE — PROGRESS NOTES
Lv Mast attended: Exercise Workshop from 3:30-4:30pm.    The topic was: Biomechanics.    Patient received handouts regarding the specific exercise information.

## 2019-03-20 ENCOUNTER — NON-PROVIDER VISIT (OUTPATIENT)
Dept: CARDIOLOGY | Facility: MEDICAL CENTER | Age: 69
End: 2019-03-20
Payer: MEDICARE

## 2019-03-20 DIAGNOSIS — Z95.5 STENTED CORONARY ARTERY: ICD-10-CM

## 2019-03-20 LAB — EKG IMPRESSION: NORMAL

## 2019-03-20 PROCEDURE — G0422 INTENS CARDIAC REHAB W/EXERC: HCPCS | Performed by: FAMILY MEDICINE

## 2019-03-20 PROCEDURE — G0423 INTENS CARDIAC REHAB NO EXER: HCPCS | Mod: 59 | Performed by: FAMILY MEDICINE

## 2019-03-22 ENCOUNTER — NON-PROVIDER VISIT (OUTPATIENT)
Dept: CARDIOLOGY | Facility: MEDICAL CENTER | Age: 69
End: 2019-03-22
Payer: MEDICARE

## 2019-03-22 DIAGNOSIS — Z95.5 STENTED CORONARY ARTERY: ICD-10-CM

## 2019-03-22 LAB
ALBUMIN SERPL-MCNC: 3.9 G/DL (ref 3.6–4.8)
ALBUMIN/GLOB SERPL: 1.6 {RATIO} (ref 1.2–2.2)
ALP SERPL-CCNC: 115 IU/L (ref 39–117)
ALT SERPL-CCNC: 132 IU/L (ref 0–44)
AMBIG ABBREV CMP14 DFLT   977206: NORMAL
AMBIG ABBREV LP  977212: NORMAL
AST SERPL-CCNC: 38 IU/L (ref 0–40)
BILIRUB SERPL-MCNC: 0.3 MG/DL (ref 0–1.2)
BUN SERPL-MCNC: 13 MG/DL (ref 8–27)
BUN/CREAT SERPL: 13 (ref 10–24)
CALCIUM SERPL-MCNC: 9.3 MG/DL (ref 8.6–10.2)
CHLORIDE SERPL-SCNC: 104 MMOL/L (ref 96–106)
CHOLEST SERPL-MCNC: 142 MG/DL (ref 100–199)
CO2 SERPL-SCNC: 23 MMOL/L (ref 20–29)
CREAT SERPL-MCNC: 1.04 MG/DL (ref 0.76–1.27)
EKG IMPRESSION: NORMAL
GLOBULIN SER CALC-MCNC: 2.5 G/DL (ref 1.5–4.5)
GLUCOSE SERPL-MCNC: 117 MG/DL (ref 65–99)
HBA1C MFR BLD: 6.5 % (ref 4.8–5.6)
HDLC SERPL-MCNC: 35 MG/DL
LABORATORY COMMENT REPORT: ABNORMAL
LDLC SERPL CALC-MCNC: 80 MG/DL (ref 0–99)
POTASSIUM SERPL-SCNC: 4.9 MMOL/L (ref 3.5–5.2)
PROT SERPL-MCNC: 6.4 G/DL (ref 6–8.5)
SODIUM SERPL-SCNC: 141 MMOL/L (ref 134–144)
TRIGL SERPL-MCNC: 136 MG/DL (ref 0–149)
VLDLC SERPL CALC-MCNC: 27 MG/DL (ref 5–40)

## 2019-03-22 PROCEDURE — G0423 INTENS CARDIAC REHAB NO EXER: HCPCS | Mod: 59 | Performed by: FAMILY MEDICINE

## 2019-03-22 PROCEDURE — G0422 INTENS CARDIAC REHAB W/EXERC: HCPCS | Performed by: FAMILY MEDICINE

## 2019-03-22 NOTE — PROGRESS NOTES
Individualized Treatment Plan   Cardiac Rehab Individual Treatment Plan  Initial/Reassessment/Discharge Assessment/ ReAssessment/ Discharge: 150 Day 19 Session #     13   MRN: [id number] Allergies: Patient has no known allergies.   Patient Name: Lv Mast : 1950 Risk Stratification: High    Diagnoses:   1. Stented coronary artery     Age: 69 y.o. Physician: Fazal Car M.D.    Date of Event: 10/31/18 Specialist: Nain   Risk Factors:  Hypertension, Hyperlipidemia, Diabetes, Family History, Obesity, Stress, Sedentary Lifestyle, Age, Male > 45   Exercise Nutrition Other Core Components/ Risk Factors Psychosocial   Stages of change Stages of change Stages of change Stages of change   Contemplate Contemplate Contemplate Contemplate   Fitness Test Lipids Learning Barriers Plan   DASI:  (na) Available: Yes Learning Barriers: Vision, Ready to Learn Psychosocial Plan: Yes (lorazepam, sleep hygiene)   DIST:  (assessed pre- and post-program) Date: 19 Family Support Intervention   Max HR:  (assessed pre- and post-program) Total: 142 mg/dL Yes Behavioral Health Consult: Yes   RPE:  (assessed pre- and post-program) Tri mg/dL Lives: Spouse Physician Referral: Yes   SPO2:  (assessed pre- and post-program) HDL: 35 mg/dL Other Risk Factors Plan Identifies Stressors: Yes   MET:  (assessed pre- and post-program) LDL: 80 mg/dL Other Risk Factors/Plan: Yes Drug Intervention: Yes (lorazepam for anxiety)   EF= 45 (10/30/2018) Diabetes Tobacco Use Outcome Survey Tools   Ambulatory Status Diabetes: No History   Smoking Status   • Former Smoker   • Types: Cigars   • Quit date:    Smokeless Tobacco   • Never Used    FP QOL Overall Score:  (assessed pre- and post-program)   Fall Risk Assessed: Yes HbA1C: 6.5 % Date: 19 Smoking Intervention PHQ-9:  (assessed pre- and post-program)   Exercise Ambulatory Status Assist Devices: None Monitors BS at Home: No Smoking Cessation Referral: N/A    "  Intervention Frequency:  (na) Random BS: 117 (3/22/2019) Ind. Education / Counseling: N/A Education   Intervention: Yes    MBSR Lectures/Videos: Yes   Exercise Prescription/ Exercise Plan       Mode: Treadmill, NuStep, UBE, Airdyne Weight Management Tobacco Adjunct: N/A Psychosocial Education: Coping Techniques, Positive Support System, S/S Depression, MBSR Lectures   Frequency: 3 days/week Weight: 101.6 kg (224 lb) Target Goal Target Goal   Duration: 30-45 Height: 185.4 cm (6' 0.99\") Complete Tobacco Cessation: Assess presence or absence of depression using a valid screening: Yes   Intensity: 11-13 RPE BMI (Calculated): 29.56 Complete Tobacco Cessation: N/A Use Stress Management: Yes   METS: 1.0-3.0 Goal weight: 200 Intervention Adverse Events: No   Progression: ^ increments of 1-3 to THR/RPE as tolerated Waist:  (assessed pre- and post-program) Healthy Heart Education: Class Schedule Given, Cooking School Attended, Dietician One-on-One Meeting Attended, Medications Reviewed, Patient Education Binder Provided, Risk Factors Discussed, Videos Viewed per Neda, Workshops Attended Unexpected Events: No      Goals     THR: THR: Other (see comment) () History   Alcohol Use No    Educate / Review and have understanding of cardiac disease prevention:    Angina with Exercise? Angina with Exercise: No Nutrition Plan Educate / Review and have understanding of cardiac disease prevention: Yes      Nutrition Plan: Yes Medication Compliance: Yes    Resistance Training? Resistance Training: Yes Intervention Hypertension Intervention      Dietician Consult/Class: Yes     Goals Nurse/Patient Discussion: Yes Weekly Lectures/ Videos/ Interactive Cooking School: Yes    Home Exercise: Yes Diabetes Ed Referral: No Hypertension Management    Mode: Walk Discuss Maintenance /Wt Loss: Yes Resting BP: 122/63    Duration: 10 minutes 3 times daily Attend Cooking School: Yes     Frequency: 7 days active  Dietary Goal: >=58 rate your " plate, low sodium    Education Education    Yes Nutrition Education: Healthy Plant Based Eating, Sodium Reduction Cooking/ Lectures/ Cooking School/  RD 1:1     Equipment Orientation, Exercise Safety, S/S to Report, RPE Scale, Warm Up / Cool Down, Physically Active Target Goal    Target Goal LDL-C < 100 if trig. > 200:  No    Start Individual Exercise Rx Yes LDL-C < 70 for high risk patient:  Yes    BP < 140/90 or < 130/80 if DM or CKD Yes Non HDL-C Should be < 130:  Yes    Aerobic activity 30 + min / day 5 days / wk Yes HbA1C < 7%: N/A      BMI < 25: Yes        Exercise     Current : 3x10 min aerobics in [facility], plus initial weight program.  Goal: 2x15 min aerobic sessions in [facility]. Two 10-minute walks at home each day.   Progress: Lv is present at [facility] more regularly now that the weather has improved. Lv is in the contemplation stage of change around adopting regular exercise as part of a healthy lifestyle.      Nutrition     Current: Met with TAWANNA Deras last week. He enjoys his red meat and coca cola. He states he is watching sodium and enjoys fresh fruit but eats out frequently and has gained 4.5lbs since his last [facility] session 2 days ago. Labs were drawn yesterday and lipid panel improved; glucose and hemoglobin A1C slightly elevated.   Goal: Decrease portion sizes of high fat/cholesterlol foods. Entertain coca cola substitution such as Zevia. Label reading for lower sodium diet.   Progress: Lv is in the contemplation stage of change around changing his eating habits. RN reviewed strategies for dining out and lablel reading.     Hypertension     Current: 122/63  Goal: Medication compliance, heart-healthy diet, consistent exercise, stress-management.  Progress: Lv is compliant with his medications and is contemplating adopting a more heart-healthy diet and exercise regimen.      Stress     Current : Lv states that he sleeps very little or does not sleep at all at night. RN discussed sleep hygiene and gave him a  worksheet to reinforce the education. RN and Lv identified areas of improvement--largely cutting down on the amount of caffeine he consumes each day and increasing exercise.   Goal: Improved quantity and quality of sleep.   Progress: Lv is attending Healthy Mindset lectures and gaining awareness of what impacts and improves his sleep.

## 2019-03-25 ENCOUNTER — NON-PROVIDER VISIT (OUTPATIENT)
Dept: CARDIOLOGY | Facility: MEDICAL CENTER | Age: 69
End: 2019-03-25
Payer: MEDICARE

## 2019-03-25 DIAGNOSIS — Z95.5 STENTED CORONARY ARTERY: ICD-10-CM

## 2019-03-25 LAB — EKG IMPRESSION: NORMAL

## 2019-03-25 PROCEDURE — G0422 INTENS CARDIAC REHAB W/EXERC: HCPCS | Performed by: FAMILY MEDICINE

## 2019-03-25 PROCEDURE — G0423 INTENS CARDIAC REHAB NO EXER: HCPCS | Mod: 59 | Performed by: FAMILY MEDICINE

## 2019-03-25 NOTE — PROGRESS NOTES
Lv Mast attended: Nutrition Workshop from [id number].      The topic was: Dining Out.     Patient received handouts regarding the specific exercise information

## 2019-03-27 ENCOUNTER — NON-PROVIDER VISIT (OUTPATIENT)
Dept: CARDIOLOGY | Facility: MEDICAL CENTER | Age: 69
End: 2019-03-27
Payer: MEDICARE

## 2019-03-27 DIAGNOSIS — Z95.5 STENTED CORONARY ARTERY: ICD-10-CM

## 2019-03-27 LAB — EKG IMPRESSION: NORMAL

## 2019-03-27 PROCEDURE — G0422 INTENS CARDIAC REHAB W/EXERC: HCPCS | Performed by: FAMILY MEDICINE

## 2019-03-27 PROCEDURE — G0423 INTENS CARDIAC REHAB NO EXER: HCPCS | Mod: 59 | Performed by: FAMILY MEDICINE

## 2019-03-27 NOTE — PROGRESS NOTES
Lv Mast attended: cooking school from [id number]  Today  prepared Surviving the Holidays: Cinnamon energy bites.    Patient received handouts and nutrition information regarding the specific recipes.

## 2019-04-03 ENCOUNTER — NON-PROVIDER VISIT (OUTPATIENT)
Dept: CARDIOLOGY | Facility: MEDICAL CENTER | Age: 69
End: 2019-04-03
Payer: MEDICARE

## 2019-04-03 DIAGNOSIS — Z95.5 STENTED CORONARY ARTERY: ICD-10-CM

## 2019-04-03 LAB — EKG IMPRESSION: NORMAL

## 2019-04-03 PROCEDURE — G0422 INTENS CARDIAC REHAB W/EXERC: HCPCS | Performed by: FAMILY MEDICINE

## 2019-04-03 PROCEDURE — G0423 INTENS CARDIAC REHAB NO EXER: HCPCS | Mod: 59 | Performed by: FAMILY MEDICINE

## 2019-04-03 NOTE — PROGRESS NOTES
vL Mast attended cooking school from 3:30-4:30PM.  prepared Chocolate fondue. Patient received pamphlets and nutrition information on the prepared dish.

## 2019-04-04 ENCOUNTER — NON-PROVIDER VISIT (OUTPATIENT)
Dept: CARDIOLOGY | Facility: MEDICAL CENTER | Age: 69
End: 2019-04-04
Payer: MEDICARE

## 2019-04-04 DIAGNOSIS — Z95.5 STENTED CORONARY ARTERY: ICD-10-CM

## 2019-04-04 LAB — EKG IMPRESSION: NORMAL

## 2019-04-04 PROCEDURE — G0422 INTENS CARDIAC REHAB W/EXERC: HCPCS | Performed by: FAMILY MEDICINE

## 2019-04-04 PROCEDURE — G0423 INTENS CARDIAC REHAB NO EXER: HCPCS | Mod: 59 | Performed by: FAMILY MEDICINE

## 2019-04-05 ENCOUNTER — NON-PROVIDER VISIT (OUTPATIENT)
Dept: CARDIOLOGY | Facility: MEDICAL CENTER | Age: 69
End: 2019-04-05
Payer: MEDICARE

## 2019-04-05 DIAGNOSIS — Z95.5 STENTED CORONARY ARTERY: ICD-10-CM

## 2019-04-05 LAB — EKG IMPRESSION: NORMAL

## 2019-04-05 PROCEDURE — G0422 INTENS CARDIAC REHAB W/EXERC: HCPCS | Performed by: FAMILY MEDICINE

## 2019-04-05 PROCEDURE — G0423 INTENS CARDIAC REHAB NO EXER: HCPCS | Mod: 59 | Performed by: FAMILY MEDICINE

## 2019-04-08 ENCOUNTER — NON-PROVIDER VISIT (OUTPATIENT)
Dept: CARDIOLOGY | Facility: MEDICAL CENTER | Age: 69
End: 2019-04-08
Payer: MEDICARE

## 2019-04-08 DIAGNOSIS — Z95.5 STENTED CORONARY ARTERY: ICD-10-CM

## 2019-04-08 LAB — EKG IMPRESSION: NORMAL

## 2019-04-08 PROCEDURE — G0423 INTENS CARDIAC REHAB NO EXER: HCPCS | Mod: 59 | Performed by: FAMILY MEDICINE

## 2019-04-08 PROCEDURE — G0422 INTENS CARDIAC REHAB W/EXERC: HCPCS | Performed by: FAMILY MEDICINE

## 2019-04-08 ASSESSMENT — PATIENT HEALTH QUESTIONNAIRE - PHQ9: SUM OF ALL RESPONSES TO PHQ QUESTIONS 1-9: 0

## 2019-04-08 NOTE — PROGRESS NOTES
Individualized Treatment Plan   Cardiac Rehab Individual Treatment Plan  Initial/Reassessment/Discharge Assessment/ ReAssessment/ Discharge: Discharge 19 Session #     19   MRN: [id number] Allergies: Patient has no known allergies.   Patient Name: Lv Mast : 1950 Risk Stratification: High    Diagnoses:   1. Stented coronary artery     Age: 69 y.o. Physician: Fazal Car M.D.    Date of Event: 10/31/18 Specialist: Nain   Risk Factors:  Hypertension, Hyperlipidemia, Diabetes, Family History, Obesity, Stress, Sedentary Lifestyle, Age, Male > 45   Exercise Nutrition Other Core Components/ Risk Factors Psychosocial   Stages of change Stages of change Stages of change Stages of change   Contemplate Action Contemplate Contemplate   Fitness Test Lipids Learning Barriers Plan   DASI:  (na) Available: Yes Learning Barriers: Vision, Ready to Learn Psychosocial Plan: Yes (lorazepam, sleep hygiene)   DIST: 1246 Date: 19 Family Support Intervention   Max HR: 95 Total: 142 mg/dL Yes Behavioral Health Consult: Yes   RPE: 11 Tri mg/dL Lives: Spouse Physician Referral: Yes   SPO2: 95 % HDL: 35 mg/dL Other Risk Factors Plan Identifies Stressors: Yes   MET: 2.78 LDL: 80 mg/dL Other Risk Factors/Plan: Yes Drug Intervention: Yes (lorazepam for anxiety)   EF= 45 (10/30/2018) Diabetes Tobacco Use Outcome Survey Tools   Ambulatory Status Diabetes: No History   Smoking Status   • Former Smoker   • Types: Cigars   • Quit date:    Smokeless Tobacco   • Never Used    FP QOL Overall Score: 18.08   Fall Risk Assessed: Yes HbA1C: 6.5 % Date: 19 Smoking Intervention PHQ-9: 0   Exercise Ambulatory Status Assist Devices: None Monitors BS at Home: No Smoking Cessation Referral: N/A     Intervention Frequency:  (na) Random BS: 117 (3/22/2019) Ind. Education / Counseling: N/A Education   Intervention: Yes    MBSR Lectures/Videos: Yes   Exercise Prescription/ Exercise Plan       Mode: Treadmill, NuStep, UBE,  "[place] Weight Management Tobacco Adjunct: N/A Psychosocial Education: Coping Techniques, Positive Support System, S/S Depression, MBSR Lectures   Frequency: 3 days/week Weight: 100.2 kg (221 lb) Target Goal Target Goal   Duration: 30-45 Height: 185.4 cm (6' 0.99\") Complete Tobacco Cessation: Assess presence or absence of depression using a valid screening: Yes   Intensity: 11-13 RPE BMI (Calculated): 29.16 Complete Tobacco Cessation: N/A Use Stress Management: Yes   METS: 1.0-3.0 Goal weight: 200 Intervention Adverse Events: No   Progression: ^ increments of 1-3 to THR/RPE as tolerated Waist: 42.25 inch Healthy Heart Education: Class Schedule Given, Cooking School Attended, Dietician One-on-One Meeting Attended, Medications Reviewed, Patient Education Binder Provided, Risk Factors Discussed, Videos Viewed per Neda, Workshops Attended Unexpected Events: No      Goals     THR: THR: Other (see comment) (110-120bpm) History   Alcohol Use No    Educate / Review and have understanding of cardiac disease prevention:    Angina with Exercise? Angina with Exercise: No Nutrition Plan Educate / Review and have understanding of cardiac disease prevention: Yes      Nutrition Plan: Yes Medication Compliance: Yes    Resistance Training? Resistance Training: Yes Intervention Hypertension Intervention      Dietician Consult/Class: Yes     Goals Nurse/Patient Discussion: Yes Weekly Lectures/ Videos/ Interactive Cooking School: Yes    Home Exercise: Yes Diabetes Ed Referral: No Hypertension Management    Mode: Walk Discuss Maintenance /Wt Loss: Yes Resting BP: 106/58    Duration: 10 minutes 3 times daily Attend Cooking School: Yes     Frequency: 7 days active  Dietary Goal: >=58 rate your plate, low sodium    Education Education    Yes Nutrition Education: Healthy Plant Based Eating, Sodium Reduction Cooking/ Lectures/ Cooking School/  RD 1:1     Equipment Orientation, Exercise Safety, S/S to Report, RPE Scale, Warm Up / Cool " "Down, Physically Active Target Goal    Target Goal LDL-C < 100 if trig. > 200:  No    Start Individual Exercise Rx Yes LDL-C < 70 for high risk patient:  Yes    BP < 140/90 or < 130/80 if DM or CKD Yes Non HDL-C Should be < 130:  Yes    Aerobic activity 30 + min / day 5 days / wk Yes HbA1C < 7%: N/A      BMI < 25: Yes      Exercise     Current: Lv is completing 2x15 minute aerobic exercises plus a weight routine in [facility]. He states he is a \"couch potato\" and doesn't enjoy exercise but he anticipates his activity will increase a bit as the snow melts.   Goal: Lv does not state any specific goals for current or future exercise.   Progress: Lv is graduating [facility] early due to insurance, but his discharge fitness test shows slight improvements in endurance, strength and cardiovascular function.     Nutrition     Current: Lv states that he and his wife purchased a pressure cooker and air fryer recently and are finding it useful in preparing healthier meals. They are using spray butter and enjoy buying mellons in pre-cut packs. He states he enjoys his red meat and Coca Cola soda.    Goal: Lv does not state any stpecific goals for his nutrition but he is excited to experiment more with the air fryer.   Progress: Lv has made some small but meaningful improvements to his diet by entertaining new cooking methods, finding ways to decrease fat/oil from diet, and increasing fruits and vegetables.     Hypertension     Current: 106/58. Blood pressure within normal limits.  Goal: Medication compliance, heart-healthy diet, consistent exercise, stress-management.  Progress: Lv is compliant with his medications and is contemplating adopting a more heart-healthy diet and exercise regimen.      Stress     Current: Lv's PHQ9 at discharge is 0, down from 6 on admission.   Goal: Lv does not state any specific goals for stress-management but his wife just retired and they are looking forward to having more time together.    " Progress: Lv has made great progress as evidenced by his PHQ9 score.      Other     Notes: Overall, Lv states he has enjoyed the program and the staff.

## 2019-05-17 ENCOUNTER — OFFICE VISIT (OUTPATIENT)
Dept: CARDIOLOGY | Facility: MEDICAL CENTER | Age: 69
End: 2019-05-17
Payer: MEDICARE

## 2019-05-17 VITALS
BODY MASS INDEX: 30.11 KG/M2 | DIASTOLIC BLOOD PRESSURE: 60 MMHG | HEART RATE: 72 BPM | SYSTOLIC BLOOD PRESSURE: 118 MMHG | HEIGHT: 73 IN | OXYGEN SATURATION: 90 % | WEIGHT: 227.2 LBS

## 2019-05-17 DIAGNOSIS — I21.4 NSTEMI (NON-ST ELEVATED MYOCARDIAL INFARCTION) (HCC): ICD-10-CM

## 2019-05-17 DIAGNOSIS — I50.20 ACC/AHA STAGE B SYSTOLIC HEART FAILURE (HCC): ICD-10-CM

## 2019-05-17 DIAGNOSIS — I10 ESSENTIAL HYPERTENSION: ICD-10-CM

## 2019-05-17 DIAGNOSIS — I25.10 CORONARY ARTERY DISEASE INVOLVING NATIVE CORONARY ARTERY OF NATIVE HEART WITHOUT ANGINA PECTORIS: ICD-10-CM

## 2019-05-17 DIAGNOSIS — E78.2 MIXED HYPERLIPIDEMIA: ICD-10-CM

## 2019-05-17 DIAGNOSIS — E03.9 ACQUIRED HYPOTHYROIDISM: ICD-10-CM

## 2019-05-17 DIAGNOSIS — Z95.5 STATUS POST INSERTION OF DRUG ELUTING CORONARY ARTERY STENT: ICD-10-CM

## 2019-05-17 PROCEDURE — 99215 OFFICE O/P EST HI 40 MIN: CPT | Performed by: INTERNAL MEDICINE

## 2019-05-17 RX ORDER — LEVOTHYROXINE SODIUM 0.03 MG/1
TABLET ORAL
Refills: 0 | COMMUNITY
Start: 2019-05-06 | End: 2020-11-04

## 2019-05-17 RX ORDER — OMEPRAZOLE 40 MG/1
CAPSULE, DELAYED RELEASE ORAL
Refills: 0 | COMMUNITY
Start: 2019-03-17 | End: 2019-05-17

## 2019-05-17 RX ORDER — TETANUS TOXOID, REDUCED DIPHTHERIA TOXOID AND ACELLULAR PERTUSSIS VACCINE, ADSORBED 5; 2.5; 8; 8; 2.5 [IU]/.5ML; [IU]/.5ML; UG/.5ML; UG/.5ML; UG/.5ML
SUSPENSION INTRAMUSCULAR
Refills: 0 | COMMUNITY
Start: 2019-05-14

## 2019-05-17 RX ORDER — GLIPIZIDE 2.5 MG/1
2.5 TABLET, EXTENDED RELEASE ORAL DAILY
Refills: 0 | COMMUNITY
Start: 2019-03-17

## 2019-05-17 RX ORDER — CEPHALEXIN 500 MG/1
CAPSULE ORAL
Refills: 0 | COMMUNITY
Start: 2019-05-14 | End: 2020-06-05

## 2019-05-17 RX ORDER — EZETIMIBE 10 MG/1
10 TABLET ORAL
Refills: 0 | COMMUNITY
Start: 2019-04-02

## 2019-05-17 ASSESSMENT — ENCOUNTER SYMPTOMS
WHEEZING: 0
SPUTUM PRODUCTION: 0
WEAKNESS: 0
RESPIRATORY NEGATIVE: 1
PND: 0
SHORTNESS OF BREATH: 0
HEMOPTYSIS: 0
ORTHOPNEA: 0
CONSTITUTIONAL NEGATIVE: 1
MUSCULOSKELETAL NEGATIVE: 1
NEUROLOGICAL NEGATIVE: 1
PALPITATIONS: 0
EYES NEGATIVE: 1
CLAUDICATION: 0
COUGH: 0
STRIDOR: 0
CHILLS: 0
DIZZINESS: 0
FEVER: 0
BRUISES/BLEEDS EASILY: 0
CARDIOVASCULAR NEGATIVE: 1
GASTROINTESTINAL NEGATIVE: 1
LOSS OF CONSCIOUSNESS: 0
SORE THROAT: 0

## 2019-05-17 ASSESSMENT — MINNESOTA LIVING WITH HEART FAILURE QUESTIONNAIRE (MLHF)
DIFFICULTY SOCIALIZING WITH FAMILY OR FRIENDS: 0
WORKING AROUND THE HOUSE OR YARD DIFFICULT: 2
MAKING YOU FEEL DEPRESSED: 0
TOTAL_SCORE: 3
MAKING YOU STAY IN A HOSPITAL: 0
TIRED, FATIGUED OR LOW ON ENERGY: 0
DIFFICULTY WITH RECREATIONAL PASTIMES, SPORTS, HOBBIES: 0
GIVING YOU SIDE EFFECTS FROM TREATMENTS: 0
WALKING ABOUT OR CLIMBING STAIRS DIFFICULT: 0
MAKING YOU WORRY: 0
DIFFICULTY GOING AWAY FROM HOME: 0
MAKING YOU SHORT OF BREATH: 0
DIFFICULTY SLEEPING WELL AT NIGHT: 0
COSTING YOU MONEY FOR MEDICAL CARE: 0
LOSS OF SELF CONTROL IN YOUR LIFE: 0
DIFFICULTY TO CONCENTRATE OR REMEMBERING THINGS: 0
DIFFICULTY WITH SEXUAL ACTIVITIES: 0
DIFFICULTY WORKING TO EARN A LIVING: 0
EATING LESS FOODS YOU LIKE: 1
SWELLING IN ANKLES OR LEGS: 0
HAVING TO SIT OR LIE DOWN DURING THE DAY: 0
FEELING LIKE A BURDEN TO FAMILY AND FRIENDS: 0

## 2019-05-17 ASSESSMENT — 6 MINUTE WALK TEST (6MWT): TOTAL DISTANCE WALKED (METERS): 366

## 2019-05-17 NOTE — LETTER
[facility]   [address], [facility] [address]  [place] [address]  Phone: [phone]  Fax: [phone]              Lv Mast  1950    Encounter Date: 5/17/2019    David Echols M.D.          PROGRESS NOTE:  Per MD, patient previously Stage B, sent for further testing workup- education not provided    Chief Complaint   Patient presents with   • Congestive Heart Failure       Subjective:   Lv Mast is a 69 y.o. male who presents today as a follow-up for his CAD status post stent.  During his hospitalization he had an echocardiogram showed an EF of 45%.  He has been completely asymptomatic with this.  He is been on dual antiplatelet therapy since his stent placement.  He received 2 stents during his hospitalization.  His blood pressure is always about 118/70.  He completed cardiac rehab.  He continues on dual therapy.  His last lipids showed an LDL of 80.    Past Medical History:   Diagnosis Date   • Anxiety    • GERD (gastroesophageal reflux disease)    • Hyperlipidemia    • Hypertension    • Hypothyroidism      Past Surgical History:   Procedure Laterality Date   • ZZZ CARDIAC CATH  10/29/2018    Synergy stents to LAD , RCA   • ARTHROSCOPY, KNEE Left 06/1992   • CATARACT EXTRACTION WITH IOL       Family History   Problem Relation Age of Onset   • Arthritis Mother    • Diabetes Mother    • Hypertension Mother    • Hyperlipidemia Mother    • Stroke Mother    • Cancer Maternal Grandmother         breast cancer   • Stroke Maternal Grandfather    • Stroke Paternal Grandfather 40        CVA   • Lung Disease Neg Hx    • Genetic Neg Hx    • Psychiatry Neg Hx    • Heart Disease Neg Hx    • Alcohol/Drug Neg Hx      Social History     Social History   • Marital status:      Spouse name: N/A   • Number of children: N/A   • Years of education: N/A     Occupational History   • Not on file.     Social History Main Topics   • Smoking status: Former Smoker     Types:  Cigars     Quit date: 2008   • Smokeless tobacco: Never Used   • Alcohol use No   • Drug use: No   • Sexual activity: Not on file     Other Topics Concern   • Not on file     Social History Narrative   • No narrative on file     No Known Allergies  Outpatient Encounter Prescriptions as of 5/17/2019   Medication Sig Dispense Refill   • cephALEXin (KEFLEX) 500 MG Cap take 1 capsule by mouth every 6 hours for 10 days  0   • ezetimibe (ZETIA) 10 MG Tab Take 10 mg by mouth.  0   • glipiZIDE SR (GLUCOTROL) 2.5 MG TABLET SR 24 HR   0   • levothyroxine (SYNTHROID) 25 MCG Tab   0   • BOOSTRIX 5-2.5-18.5 LF-MCG/0.5 Suspension inject 0.5 milliliter intramuscularly  0   • atorvastatin (LIPITOR) 80 MG tablet Take 1 Tab by mouth every evening. 90 Tab 3   • metoprolol (LOPRESSOR) 25 MG Tab Take 0.5 Tabs by mouth 2 Times a Day. 45 Tab 3   • ticagrelor (BRILINTA) 90 MG Tab tablet Take 1 Tab by mouth 2 Times a Day. 60 Tab 11   • losartan (COZAAR) 50 MG Tab Take 1 Tab by mouth every evening. 90 Tab 3   • aspirin EC (ECOTRIN) 81 MG Tablet Delayed Response Take 81 mg by mouth every day.     • LORazepam (ATIVAN) 1 MG Tab Take 1 mg by mouth 2 Times a Day.     • omeprazole (PRILOSEC) 20 MG delayed-release capsule Take 20 mg by mouth 2 times a day.     • diphenhydrAMINE (BENADRYL) 25 MG Tab Take 50 mg by mouth 1 time daily as needed for Sleep.     • acetaminophen (TYLENOL) 500 MG Tab Take 2,000 mg by mouth every evening as needed (Pain, Sleep).     • [DISCONTINUED] omeprazole (PRILOSEC) 40 MG delayed-release capsule   0     No facility-administered encounter medications on file as of 5/17/2019.      Review of Systems   Constitutional: Negative.  Negative for chills, fever and malaise/fatigue.   HENT: Negative.  Negative for sore throat.    Eyes: Negative.    Respiratory: Negative.  Negative for cough, hemoptysis, sputum production, shortness of breath, wheezing and stridor.    Cardiovascular: Negative.  Negative for chest pain,  "palpitations, orthopnea, claudication, leg swelling and PND.   Gastrointestinal: Negative.    Genitourinary: Negative.    Musculoskeletal: Negative.    Skin: Negative.    Neurological: Negative.  Negative for dizziness, loss of consciousness and weakness.   Endo/Heme/Allergies: Negative.  Does not bruise/bleed easily.   All other systems reviewed and are negative.       Objective:   /60 (BP Location: Left arm, Patient Position: Sitting, BP Cuff Size: Adult)   Pulse 72   Ht 1.854 m (6' 1\")   Wt 103.1 kg (227 lb 3.2 oz)   SpO2 90%   BMI 29.98 kg/m²      Physical Exam   Constitutional: He appears well-developed and well-nourished. No distress.   HENT:   Head: Normocephalic and atraumatic.   Right Ear: External ear normal.   Left Ear: External ear normal.   Nose: Nose normal.   Mouth/Throat: No oropharyngeal exudate.   Eyes: Pupils are equal, round, and reactive to light. Conjunctivae and EOM are normal. Right eye exhibits no discharge. Left eye exhibits no discharge. No scleral icterus.   Neck: Neck supple. No JVD present.   Cardiovascular: Normal rate, regular rhythm and intact distal pulses.  Exam reveals no gallop and no friction rub.    No murmur heard.  Pulmonary/Chest: Effort normal. No stridor. No respiratory distress. He has no wheezes. He has no rales. He exhibits no tenderness.   Abdominal: Soft. He exhibits no distension. There is no guarding.   Musculoskeletal: Normal range of motion. He exhibits no edema, tenderness or deformity.   Neurological: He is alert. He has normal reflexes. He displays normal reflexes. No cranial nerve deficit. He exhibits normal muscle tone. Coordination normal.   Skin: Skin is warm and dry. No rash noted. He is not diaphoretic. No erythema. No pallor.   Psychiatric: He has a normal mood and affect. His behavior is normal. Judgment and thought content normal.   Nursing note and vitals reviewed.      Assessment:     1. Status post insertion of drug eluting coronary " artery stent  EC-ECHOCARDIOGRAM COMPLETE W/O CONT   2. NSTEMI (non-ST elevated myocardial infarction) (HCC)  EC-ECHOCARDIOGRAM COMPLETE W/O CONT   3. Mixed hyperlipidemia  EC-ECHOCARDIOGRAM COMPLETE W/O CONT   4. Essential hypertension     5. Coronary artery disease involving native coronary artery of native heart without angina pectoris  EC-ECHOCARDIOGRAM COMPLETE W/O CONT   6. Acquired hypothyroidism     7. ACC/AHA stage B systolic heart failure (HCC)  EC-ECHOCARDIOGRAM COMPLETE W/O CONT       Medical Decision Making:  Today's Assessment / Status / Plan:     69-year-old male with stage B systolic heart failure.  We will go ahead and recheck his echocardiogram.  We will keep him on dual antiplatelet therapy for 1 year.  He will stay on Zetia and atorvastatin for his hyperlipidemia.  We will see him back in 6 months.    1. CAD s/p stent    - asa    - ticagrelor 90 BID     2. HLD    - atorva 80     3. HTN    - per below     4. Stage B systolic heart failure    - metop 12.5 BID    - cont losartan 50    Thank for you allowing me to take part in your patient's care, please call should you have any questions or would like to discuss this patient.      Fazal Car M.D.  [address] [place] [id number]  VIA Mail

## 2019-05-17 NOTE — PROGRESS NOTES
Chief Complaint   Patient presents with   • Congestive Heart Failure       Subjective:   Lv Mast is a 69 y.o. male who presents today as a follow-up for his CAD status post stent.  During his hospitalization he had an echocardiogram showed an EF of 45%.  He has been completely asymptomatic with this.  He is been on dual antiplatelet therapy since his stent placement.  He received 2 stents during his hospitalization.  His blood pressure is always about 118/70.  He completed cardiac rehab.  He continues on dual therapy.  His last lipids showed an LDL of 80.    Past Medical History:   Diagnosis Date   • Anxiety    • GERD (gastroesophageal reflux disease)    • Hyperlipidemia    • Hypertension    • Hypothyroidism      Past Surgical History:   Procedure Laterality Date   • ZZZ CARDIAC CATH  10/29/2018    Synergy stents to LAD , RCA   • ARTHROSCOPY, KNEE Left 06/1992   • CATARACT EXTRACTION WITH IOL       Family History   Problem Relation Age of Onset   • Arthritis Mother    • Diabetes Mother    • Hypertension Mother    • Hyperlipidemia Mother    • Stroke Mother    • Cancer Maternal Grandmother         breast cancer   • Stroke Maternal Grandfather    • Stroke Paternal Grandfather 40        CVA   • Lung Disease Neg Hx    • Genetic Neg Hx    • Psychiatry Neg Hx    • Heart Disease Neg Hx    • Alcohol/Drug Neg Hx      Social History     Social History   • Marital status:      Spouse name: N/A   • Number of children: N/A   • Years of education: N/A     Occupational History   • Not on file.     Social History Main Topics   • Smoking status: Former Smoker     Types: Cigars     Quit date: 2008   • Smokeless tobacco: Never Used   • Alcohol use No   • Drug use: No   • Sexual activity: Not on file     Other Topics Concern   • Not on file     Social History Narrative   • No narrative on file     No Known Allergies  Outpatient Encounter Prescriptions as of 5/17/2019   Medication Sig Dispense Refill   • cephALEXin (KEFLEX)  500 MG Cap take 1 capsule by mouth every 6 hours for 10 days  0   • ezetimibe (ZETIA) 10 MG Tab Take 10 mg by mouth.  0   • glipiZIDE SR (GLUCOTROL) 2.5 MG TABLET SR 24 HR   0   • levothyroxine (SYNTHROID) 25 MCG Tab   0   • BOOSTRIX 5-2.5-18.5 LF-MCG/0.5 Suspension inject 0.5 milliliter intramuscularly  0   • atorvastatin (LIPITOR) 80 MG tablet Take 1 Tab by mouth every evening. 90 Tab 3   • metoprolol (LOPRESSOR) 25 MG Tab Take 0.5 Tabs by mouth 2 Times a Day. 45 Tab 3   • ticagrelor (BRILINTA) 90 MG Tab tablet Take 1 Tab by mouth 2 Times a Day. 60 Tab 11   • losartan (COZAAR) 50 MG Tab Take 1 Tab by mouth every evening. 90 Tab 3   • aspirin EC (ECOTRIN) 81 MG Tablet Delayed Response Take 81 mg by mouth every day.     • LORazepam (ATIVAN) 1 MG Tab Take 1 mg by mouth 2 Times a Day.     • omeprazole (PRILOSEC) 20 MG delayed-release capsule Take 20 mg by mouth 2 times a day.     • diphenhydrAMINE (BENADRYL) 25 MG Tab Take 50 mg by mouth 1 time daily as needed for Sleep.     • acetaminophen (TYLENOL) 500 MG Tab Take 2,000 mg by mouth every evening as needed (Pain, Sleep).     • [DISCONTINUED] omeprazole (PRILOSEC) 40 MG delayed-release capsule   0     No facility-administered encounter medications on file as of 5/17/2019.      Review of Systems   Constitutional: Negative.  Negative for chills, fever and malaise/fatigue.   HENT: Negative.  Negative for sore throat.    Eyes: Negative.    Respiratory: Negative.  Negative for cough, hemoptysis, sputum production, shortness of breath, wheezing and stridor.    Cardiovascular: Negative.  Negative for chest pain, palpitations, orthopnea, claudication, leg swelling and PND.   Gastrointestinal: Negative.    Genitourinary: Negative.    Musculoskeletal: Negative.    Skin: Negative.    Neurological: Negative.  Negative for dizziness, loss of consciousness and weakness.   Endo/Heme/Allergies: Negative.  Does not bruise/bleed easily.   All other systems reviewed and are  "negative.       Objective:   /60 (BP Location: Left arm, Patient Position: Sitting, BP Cuff Size: Adult)   Pulse 72   Ht 1.854 m (6' 1\")   Wt 103.1 kg (227 lb 3.2 oz)   SpO2 90%   BMI 29.98 kg/m²     Physical Exam   Constitutional: He appears well-developed and well-nourished. No distress.   HENT:   Head: Normocephalic and atraumatic.   Right Ear: External ear normal.   Left Ear: External ear normal.   Nose: Nose normal.   Mouth/Throat: No oropharyngeal exudate.   Eyes: Pupils are equal, round, and reactive to light. Conjunctivae and EOM are normal. Right eye exhibits no discharge. Left eye exhibits no discharge. No scleral icterus.   Neck: Neck supple. No JVD present.   Cardiovascular: Normal rate, regular rhythm and intact distal pulses.  Exam reveals no gallop and no friction rub.    No murmur heard.  Pulmonary/Chest: Effort normal. No stridor. No respiratory distress. He has no wheezes. He has no rales. He exhibits no tenderness.   Abdominal: Soft. He exhibits no distension. There is no guarding.   Musculoskeletal: Normal range of motion. He exhibits no edema, tenderness or deformity.   Neurological: He is alert. He has normal reflexes. He displays normal reflexes. No cranial nerve deficit. He exhibits normal muscle tone. Coordination normal.   Skin: Skin is warm and dry. No rash noted. He is not diaphoretic. No erythema. No pallor.   Psychiatric: He has a normal mood and affect. His behavior is normal. Judgment and thought content normal.   Nursing note and vitals reviewed.      Assessment:     1. Status post insertion of drug eluting coronary artery stent  EC-ECHOCARDIOGRAM COMPLETE W/O CONT   2. NSTEMI (non-ST elevated myocardial infarction) (HCC)  EC-ECHOCARDIOGRAM COMPLETE W/O CONT   3. Mixed hyperlipidemia  EC-ECHOCARDIOGRAM COMPLETE W/O CONT   4. Essential hypertension     5. Coronary artery disease involving native coronary artery of native heart without angina pectoris  EC-ECHOCARDIOGRAM " COMPLETE W/O CONT   6. Acquired hypothyroidism     7. ACC/AHA stage B systolic heart failure (HCC)  EC-ECHOCARDIOGRAM COMPLETE W/O CONT       Medical Decision Making:  Today's Assessment / Status / Plan:     69-year-old male with stage B systolic heart failure.  We will go ahead and recheck his echocardiogram.  We will keep him on dual antiplatelet therapy for 1 year.  He will stay on Zetia and atorvastatin for his hyperlipidemia.  We will see him back in 6 months.    1. CAD s/p stent    - asa    - ticagrelor 90 BID     2. HLD    - atorva 80     3. HTN    - per below     4. Stage B systolic heart failure    - metop 12.5 BID    - cont losartan 50    Thank for you allowing me to take part in your patient's care, please call should you have any questions or would like to discuss this patient.

## 2019-06-06 ENCOUNTER — HOSPITAL ENCOUNTER (OUTPATIENT)
Dept: CARDIOLOGY | Facility: MEDICAL CENTER | Age: 69
End: 2019-06-06
Attending: INTERNAL MEDICINE
Payer: MEDICARE

## 2019-06-06 DIAGNOSIS — Z95.5 STATUS POST INSERTION OF DRUG ELUTING CORONARY ARTERY STENT: ICD-10-CM

## 2019-06-06 DIAGNOSIS — I50.20 ACC/AHA STAGE B SYSTOLIC HEART FAILURE (HCC): ICD-10-CM

## 2019-06-06 DIAGNOSIS — I25.10 CORONARY ARTERY DISEASE INVOLVING NATIVE CORONARY ARTERY OF NATIVE HEART WITHOUT ANGINA PECTORIS: ICD-10-CM

## 2019-06-06 DIAGNOSIS — E78.2 MIXED HYPERLIPIDEMIA: ICD-10-CM

## 2019-06-06 DIAGNOSIS — I21.4 NSTEMI (NON-ST ELEVATED MYOCARDIAL INFARCTION) (HCC): ICD-10-CM

## 2019-06-06 LAB
LV EJECT FRACT  99904: 70
LV EJECT FRACT MOD 2C 99903: 75.89
LV EJECT FRACT MOD 4C 99902: 70.79
LV EJECT FRACT MOD BP 99901: 72.11

## 2019-06-06 PROCEDURE — 93306 TTE W/DOPPLER COMPLETE: CPT

## 2019-06-06 PROCEDURE — 93306 TTE W/DOPPLER COMPLETE: CPT | Mod: 26 | Performed by: INTERNAL MEDICINE

## 2019-10-30 ENCOUNTER — OFFICE VISIT (OUTPATIENT)
Dept: CARDIOLOGY | Facility: MEDICAL CENTER | Age: 69
End: 2019-10-30
Payer: MEDICARE

## 2019-10-30 VITALS
WEIGHT: 221 LBS | OXYGEN SATURATION: 94 % | SYSTOLIC BLOOD PRESSURE: 102 MMHG | HEART RATE: 70 BPM | HEIGHT: 73 IN | BODY MASS INDEX: 29.29 KG/M2 | DIASTOLIC BLOOD PRESSURE: 60 MMHG

## 2019-10-30 DIAGNOSIS — E03.9 ACQUIRED HYPOTHYROIDISM: ICD-10-CM

## 2019-10-30 DIAGNOSIS — E78.2 MIXED HYPERLIPIDEMIA: ICD-10-CM

## 2019-10-30 DIAGNOSIS — Z79.899 HIGH RISK MEDICATION USE: ICD-10-CM

## 2019-10-30 DIAGNOSIS — Z95.5 STATUS POST INSERTION OF DRUG ELUTING CORONARY ARTERY STENT: ICD-10-CM

## 2019-10-30 DIAGNOSIS — R74.01 TRANSAMINITIS: ICD-10-CM

## 2019-10-30 DIAGNOSIS — I10 ESSENTIAL HYPERTENSION: ICD-10-CM

## 2019-10-30 DIAGNOSIS — I21.4 NSTEMI (NON-ST ELEVATED MYOCARDIAL INFARCTION) (HCC): ICD-10-CM

## 2019-10-30 DIAGNOSIS — I25.10 CORONARY ARTERY DISEASE INVOLVING NATIVE CORONARY ARTERY OF NATIVE HEART WITHOUT ANGINA PECTORIS: ICD-10-CM

## 2019-10-30 DIAGNOSIS — I50.20 ACC/AHA STAGE B SYSTOLIC HEART FAILURE (HCC): ICD-10-CM

## 2019-10-30 PROCEDURE — 99214 OFFICE O/P EST MOD 30 MIN: CPT | Performed by: INTERNAL MEDICINE

## 2019-10-30 RX ORDER — AMOXICILLIN 500 MG/1
CAPSULE ORAL
Refills: 0 | COMMUNITY
Start: 2019-09-16 | End: 2019-10-30

## 2019-10-30 RX ORDER — AZITHROMYCIN 250 MG/1
TABLET, FILM COATED ORAL
Refills: 0 | COMMUNITY
Start: 2019-08-20 | End: 2019-10-30

## 2019-10-30 RX ORDER — OMEPRAZOLE 40 MG/1
CAPSULE, DELAYED RELEASE ORAL
Refills: 0 | COMMUNITY
Start: 2019-09-13 | End: 2019-10-30

## 2019-10-30 ASSESSMENT — ENCOUNTER SYMPTOMS
HEMOPTYSIS: 0
EYES NEGATIVE: 1
STRIDOR: 0
PND: 0
CARDIOVASCULAR NEGATIVE: 1
SORE THROAT: 0
ORTHOPNEA: 0
CHILLS: 0
GASTROINTESTINAL NEGATIVE: 1
WHEEZING: 0
MUSCULOSKELETAL NEGATIVE: 1
PALPITATIONS: 0
SPUTUM PRODUCTION: 0
CLAUDICATION: 0
WEAKNESS: 0
NEUROLOGICAL NEGATIVE: 1
RESPIRATORY NEGATIVE: 1
FEVER: 0
DIZZINESS: 0
BRUISES/BLEEDS EASILY: 0
CONSTITUTIONAL NEGATIVE: 1
SHORTNESS OF BREATH: 0
LOSS OF CONSCIOUSNESS: 0
COUGH: 0

## 2019-10-30 ASSESSMENT — MINNESOTA LIVING WITH HEART FAILURE QUESTIONNAIRE (MLHF)
MAKING YOU FEEL DEPRESSED: 1
COSTING YOU MONEY FOR MEDICAL CARE: 1
HAVING TO SIT OR LIE DOWN DURING THE DAY: 2
EATING LESS FOODS YOU LIKE: 2
GIVING YOU SIDE EFFECTS FROM TREATMENTS: 0
TIRED, FATIGUED OR LOW ON ENERGY: 0
MAKING YOU WORRY: 1
DIFFICULTY GOING AWAY FROM HOME: 2
MAKING YOU STAY IN A HOSPITAL: 5
FEELING LIKE A BURDEN TO FAMILY AND FRIENDS: 1
DIFFICULTY WITH RECREATIONAL PASTIMES, SPORTS, HOBBIES: 1
SWELLING IN ANKLES OR LEGS: 0
LOSS OF SELF CONTROL IN YOUR LIFE: 0
DIFFICULTY WORKING TO EARN A LIVING: 1
DIFFICULTY SOCIALIZING WITH FAMILY OR FRIENDS: 0
WORKING AROUND THE HOUSE OR YARD DIFFICULT: 0
DIFFICULTY SLEEPING WELL AT NIGHT: 2
DIFFICULTY WITH SEXUAL ACTIVITIES: 4
DIFFICULTY TO CONCENTRATE OR REMEMBERING THINGS: 0
WALKING ABOUT OR CLIMBING STAIRS DIFFICULT: 2
MAKING YOU SHORT OF BREATH: 3
TOTAL_SCORE: 28

## 2019-10-30 ASSESSMENT — 6 MINUTE WALK TEST (6MWT): TOTAL DISTANCE WALKED (METERS): 329

## 2019-10-30 NOTE — PROGRESS NOTES
Chief Complaint   Patient presents with   • Congestive Heart Failure       Subjective:   Lv Mast is a 69 y.o. male who presents today as a follow-up for his CAD status post stent abnormalities hypertension hyperlipidemia and stage B heart failure.  Since we last saw him had a repeat echocardiogram showing normalization of his EF.  His lipids are goal.  He had some LFTs done that showed an elevated ALT but normal AST.  He is never been tested for hepatitis.  His primary care thought that this perhaps was was gallbladder issue.  He continues atorvastatin 80.  He has been 1 year since his stent placement.    Past Medical History:   Diagnosis Date   • Anxiety    • GERD (gastroesophageal reflux disease)    • Hyperlipidemia    • Hypertension    • Hypothyroidism      Past Surgical History:   Procedure Laterality Date   • ZZZ CARDIAC CATH  10/29/2018    Synergy stents to LAD , RCA   • ARTHROSCOPY, KNEE Left 06/1992   • CATARACT EXTRACTION WITH IOL       Family History   Problem Relation Age of Onset   • Arthritis Mother    • Diabetes Mother    • Hypertension Mother    • Hyperlipidemia Mother    • Stroke Mother    • Cancer Maternal Grandmother         breast cancer   • Stroke Maternal Grandfather    • Stroke Paternal Grandfather 40        CVA   • Lung Disease Neg Hx    • Genetic Disorder Neg Hx    • Psychiatric Illness Neg Hx    • Heart Disease Neg Hx    • Alcohol/Drug Neg Hx      Social History     Socioeconomic History   • Marital status:      Spouse name: Not on file   • Number of children: Not on file   • Years of education: Not on file   • Highest education level: Not on file   Occupational History   • Not on file   Social Needs   • Financial resource strain: Not on file   • Food insecurity:     Worry: Not on file     Inability: Not on file   • Transportation needs:     Medical: Not on file     Non-medical: Not on file   Tobacco Use   • Smoking status: Former Smoker     Types: Cigars     Last attempt to quit:  2008     Years since quittin.8   • Smokeless tobacco: Never Used   Substance and Sexual Activity   • Alcohol use: No   • Drug use: No   • Sexual activity: Not on file   Lifestyle   • Physical activity:     Days per week: Not on file     Minutes per session: Not on file   • Stress: Not on file   Relationships   • Social connections:     Talks on phone: Not on file     Gets together: Not on file     Attends Mu-ism service: Not on file     Active member of club or organization: Not on file     Attends meetings of clubs or organizations: Not on file     Relationship status: Not on file   • Intimate partner violence:     Fear of current or ex partner: Not on file     Emotionally abused: Not on file     Physically abused: Not on file     Forced sexual activity: Not on file   Other Topics Concern   • Not on file   Social History Narrative   • Not on file     No Known Allergies  Outpatient Encounter Medications as of 10/30/2019   Medication Sig Dispense Refill   • cephALEXin (KEFLEX) 500 MG Cap take 1 capsule by mouth every 6 hours for 10 days  0   • ezetimibe (ZETIA) 10 MG Tab Take 10 mg by mouth.  0   • glipiZIDE SR (GLUCOTROL) 2.5 MG TABLET SR 24 HR   0   • levothyroxine (SYNTHROID) 25 MCG Tab   0   • BOOSTRIX 5-2.5-18.5 LF-MCG/0.5 Suspension inject 0.5 milliliter intramuscularly  0   • atorvastatin (LIPITOR) 80 MG tablet Take 1 Tab by mouth every evening. 90 Tab 3   • metoprolol (LOPRESSOR) 25 MG Tab Take 0.5 Tabs by mouth 2 Times a Day. 45 Tab 3   • losartan (COZAAR) 50 MG Tab Take 1 Tab by mouth every evening. 90 Tab 3   • aspirin EC (ECOTRIN) 81 MG Tablet Delayed Response Take 81 mg by mouth every day.     • LORazepam (ATIVAN) 1 MG Tab Take 1 mg by mouth 2 Times a Day.     • omeprazole (PRILOSEC) 20 MG delayed-release capsule Take 20 mg by mouth 2 times a day.     • diphenhydrAMINE (BENADRYL) 25 MG Tab Take 50 mg by mouth 1 time daily as needed for Sleep.     • acetaminophen (TYLENOL) 500 MG Tab Take 2,000 mg  "by mouth every evening as needed (Pain, Sleep).     • [DISCONTINUED] omeprazole (PRILOSEC) 40 MG delayed-release capsule   0   • [DISCONTINUED] azithromycin (ZITHROMAX) 250 MG Tab take 2 tablets by mouth today then take 1 tablet DAILY FOR 4 DAYS  0   • [DISCONTINUED] amoxicillin (AMOXIL) 500 MG Cap take 1 capsule by mouth three times a day until finished  0   • [DISCONTINUED] ticagrelor (BRILINTA) 90 MG Tab tablet Take 1 Tab by mouth 2 Times a Day. 60 Tab 11     No facility-administered encounter medications on file as of 10/30/2019.      Review of Systems   Constitutional: Negative.  Negative for chills, fever and malaise/fatigue.   HENT: Negative.  Negative for sore throat.    Eyes: Negative.    Respiratory: Negative.  Negative for cough, hemoptysis, sputum production, shortness of breath, wheezing and stridor.    Cardiovascular: Negative.  Negative for chest pain, palpitations, orthopnea, claudication, leg swelling and PND.   Gastrointestinal: Negative.    Genitourinary: Negative.    Musculoskeletal: Negative.    Skin: Negative.    Neurological: Negative.  Negative for dizziness, loss of consciousness and weakness.   Endo/Heme/Allergies: Negative.  Does not bruise/bleed easily.   All other systems reviewed and are negative.       Objective:   /60 (BP Location: Left arm, Patient Position: Sitting, BP Cuff Size: Adult)   Pulse 70   Ht 1.854 m (6' 1\")   Wt 100.2 kg (221 lb)   SpO2 94%   BMI 29.16 kg/m²     Physical Exam   Constitutional: He appears well-developed and well-nourished. No distress.   HENT:   Head: Normocephalic and atraumatic.   Right Ear: External ear normal.   Left Ear: External ear normal.   Nose: Nose normal.   Mouth/Throat: No oropharyngeal exudate.   Eyes: Pupils are equal, round, and reactive to light. Conjunctivae and EOM are normal. Right eye exhibits no discharge. Left eye exhibits no discharge. No scleral icterus.   Neck: Neck supple. No JVD present.   Cardiovascular: Normal rate, " regular rhythm and intact distal pulses. Exam reveals no gallop and no friction rub.   No murmur heard.  Pulmonary/Chest: Effort normal. No stridor. No respiratory distress. He has no wheezes. He has no rales. He exhibits no tenderness.   Abdominal: Soft. He exhibits no distension. There is no guarding.   Musculoskeletal: Normal range of motion. He exhibits no edema, tenderness or deformity.   Neurological: He is alert. He has normal reflexes. He displays normal reflexes. No cranial nerve deficit. He exhibits normal muscle tone. Coordination normal.   Skin: Skin is warm and dry. No rash noted. He is not diaphoretic. No erythema. No pallor.   Psychiatric: He has a normal mood and affect. His behavior is normal. Judgment and thought content normal.   Nursing note and vitals reviewed.      Assessment:     1. Essential hypertension     2. Mixed hyperlipidemia     3. NSTEMI (non-ST elevated myocardial infarction) (HCC)     4. Status post insertion of drug eluting coronary artery stent     5. Coronary artery disease involving native coronary artery of native heart without angina pectoris     6. Acquired hypothyroidism     7. ACC/AHA stage B systolic heart failure (HCC)     8. Transaminitis  HEPATITIS PANEL ACUTE(4 COMPONENTS)       Medical Decision Making:  Today's Assessment / Status / Plan:     69-year-old male with stage B heart failure with normalized function with no functional limitations with normalized EF.  For his transaminitis we will check a hepatitis panel.  His EF is normalized.  He has been 1 year since his stent placement we will stop his Brilinta.  We will otherwise see him back in 3 months.  If his hepatitis testing is negative we will stop his atorvastatin.    1. CAD s/p stent    - asa    - stop ticagrelor 90 BID     2. HLD    - atorva 80     3. HTN    - per below     4. Stage B systolic heart failure    - metop 12.5 BID    - cont losartan 50    5. Transaminitis, ALT?AST    - Hepatitis testing    - F/U  PCP    - if hep testing negative stop atorva for one month

## 2019-12-15 LAB
HAV IGM SERPL QL IA: NEGATIVE
HBV CORE IGM SERPL QL IA: NEGATIVE
HBV SURFACE AG SERPL QL IA: NEGATIVE
HCV AB S/CO SERPL IA: <0.1 S/CO RATIO (ref 0–0.9)

## 2019-12-16 ENCOUNTER — TELEPHONE (OUTPATIENT)
Dept: CARDIOLOGY | Facility: MEDICAL CENTER | Age: 69
End: 2019-12-16

## 2019-12-16 DIAGNOSIS — I50.20 ACC/AHA STAGE B SYSTOLIC HEART FAILURE (HCC): ICD-10-CM

## 2019-12-16 DIAGNOSIS — I25.10 CORONARY ARTERY DISEASE INVOLVING NATIVE CORONARY ARTERY OF NATIVE HEART WITHOUT ANGINA PECTORIS: ICD-10-CM

## 2019-12-16 DIAGNOSIS — E78.2 MIXED HYPERLIPIDEMIA: ICD-10-CM

## 2019-12-16 NOTE — TELEPHONE ENCOUNTER
Discussed negative hepatitis panel with RO. He advises that pt stop his atorvastatin and recheck LFT in 2-3 weeks. Called pt and notified him of this. Mailed lab slip for repeat hepatic panel in 2-3 weeks.

## 2020-02-29 DIAGNOSIS — I10 ESSENTIAL HYPERTENSION: ICD-10-CM

## 2020-03-02 RX ORDER — LOSARTAN POTASSIUM 50 MG/1
TABLET ORAL
Qty: 90 TAB | Refills: 2 | Status: SHIPPED | OUTPATIENT
Start: 2020-03-02 | End: 2020-11-18

## 2020-04-18 ENCOUNTER — HOSPITAL ENCOUNTER (INPATIENT)
Dept: HOSPITAL 8 - ED | Age: 70
LOS: 3 days | Discharge: HOME | DRG: 246 | End: 2020-04-21
Attending: HOSPITALIST | Admitting: HOSPITALIST
Payer: MEDICARE

## 2020-04-18 VITALS — SYSTOLIC BLOOD PRESSURE: 107 MMHG | DIASTOLIC BLOOD PRESSURE: 74 MMHG

## 2020-04-18 VITALS — BODY MASS INDEX: 24.95 KG/M2 | WEIGHT: 188.27 LBS | HEIGHT: 73 IN

## 2020-04-18 VITALS — DIASTOLIC BLOOD PRESSURE: 73 MMHG | SYSTOLIC BLOOD PRESSURE: 117 MMHG

## 2020-04-18 VITALS — SYSTOLIC BLOOD PRESSURE: 103 MMHG | DIASTOLIC BLOOD PRESSURE: 72 MMHG

## 2020-04-18 DIAGNOSIS — I50.31: ICD-10-CM

## 2020-04-18 DIAGNOSIS — R79.89: ICD-10-CM

## 2020-04-18 DIAGNOSIS — E03.9: ICD-10-CM

## 2020-04-18 DIAGNOSIS — Z95.5: ICD-10-CM

## 2020-04-18 DIAGNOSIS — I11.0: ICD-10-CM

## 2020-04-18 DIAGNOSIS — I25.10: ICD-10-CM

## 2020-04-18 DIAGNOSIS — Z91.048: ICD-10-CM

## 2020-04-18 DIAGNOSIS — E11.9: ICD-10-CM

## 2020-04-18 DIAGNOSIS — Z87.891: ICD-10-CM

## 2020-04-18 DIAGNOSIS — E78.5: ICD-10-CM

## 2020-04-18 DIAGNOSIS — I21.4: Primary | ICD-10-CM

## 2020-04-18 LAB
ALBUMIN SERPL-MCNC: 3.6 G/DL (ref 3.4–5)
ALP SERPL-CCNC: 66 U/L (ref 45–117)
ALT SERPL-CCNC: 61 U/L (ref 12–78)
ANION GAP SERPL CALC-SCNC: 6 MMOL/L (ref 5–15)
BASOPHILS # BLD AUTO: 0.03 X10^3/UL (ref 0–0.1)
BASOPHILS NFR BLD AUTO: 0 % (ref 0–1)
BILIRUB SERPL-MCNC: 0.5 MG/DL (ref 0.2–1)
CALCIUM SERPL-MCNC: 8.9 MG/DL (ref 8.5–10.1)
CHLORIDE SERPL-SCNC: 105 MMOL/L (ref 98–107)
CREAT SERPL-MCNC: 0.88 MG/DL (ref 0.7–1.3)
EOSINOPHIL # BLD AUTO: 0.04 X10^3/UL (ref 0–0.4)
EOSINOPHIL NFR BLD AUTO: 0 % (ref 1–7)
ERYTHROCYTE [DISTWIDTH] IN BLOOD BY AUTOMATED COUNT: 14.4 % (ref 9.4–14.8)
INR PPP: 1.08 (ref 0.93–1.1)
LYMPHOCYTES # BLD AUTO: 1.66 X10^3/UL (ref 1–3.4)
LYMPHOCYTES NFR BLD AUTO: 15 % (ref 22–44)
MCH RBC QN AUTO: 31.5 PG (ref 27.5–34.5)
MCHC RBC AUTO-ENTMCNC: 33.2 G/DL (ref 33.2–36.2)
MCV RBC AUTO: 95 FL (ref 81–97)
MD: NO
MONOCYTES # BLD AUTO: 0.37 X10^3/UL (ref 0.2–0.8)
MONOCYTES NFR BLD AUTO: 3 % (ref 2–9)
NEUTROPHILS # BLD AUTO: 9.21 X10^3/UL (ref 1.8–6.8)
NEUTROPHILS NFR BLD AUTO: 82 % (ref 42–75)
PLATELET # BLD AUTO: 244 X10^3/UL (ref 130–400)
PMV BLD AUTO: 9.8 FL (ref 7.4–10.4)
PROT SERPL-MCNC: 6.9 G/DL (ref 6.4–8.2)
PROTHROMBIN TIME: 11.5 SECONDS (ref 9.6–11.5)
RBC # BLD AUTO: 4.93 X10^6/UL (ref 4.38–5.82)
TROPONIN I SERPL-MCNC: 2.11 NG/ML (ref 0–0.04)
TROPONIN I SERPL-MCNC: 7.28 NG/ML (ref 0–0.04)

## 2020-04-18 PROCEDURE — C9600 PERC DRUG-EL COR STENT SING: HCPCS

## 2020-04-18 PROCEDURE — 36415 COLL VENOUS BLD VENIPUNCTURE: CPT

## 2020-04-18 PROCEDURE — 84484 ASSAY OF TROPONIN QUANT: CPT

## 2020-04-18 PROCEDURE — C8929 TTE W OR WO FOL WCON,DOPPLER: HCPCS

## 2020-04-18 PROCEDURE — 85014 HEMATOCRIT: CPT

## 2020-04-18 PROCEDURE — 96374 THER/PROPH/DIAG INJ IV PUSH: CPT

## 2020-04-18 PROCEDURE — C1769 GUIDE WIRE: HCPCS

## 2020-04-18 PROCEDURE — C1887 CATHETER, GUIDING: HCPCS

## 2020-04-18 PROCEDURE — 93005 ELECTROCARDIOGRAM TRACING: CPT

## 2020-04-18 PROCEDURE — 83880 ASSAY OF NATRIURETIC PEPTIDE: CPT

## 2020-04-18 PROCEDURE — 85025 COMPLETE CBC W/AUTO DIFF WBC: CPT

## 2020-04-18 PROCEDURE — 93458 L HRT ARTERY/VENTRICLE ANGIO: CPT

## 2020-04-18 PROCEDURE — 96375 TX/PRO/DX INJ NEW DRUG ADDON: CPT

## 2020-04-18 PROCEDURE — C1894 INTRO/SHEATH, NON-LASER: HCPCS

## 2020-04-18 PROCEDURE — 85610 PROTHROMBIN TIME: CPT

## 2020-04-18 PROCEDURE — C1874 STENT, COATED/COV W/DEL SYS: HCPCS

## 2020-04-18 PROCEDURE — C1725 CATH, TRANSLUMIN NON-LASER: HCPCS

## 2020-04-18 PROCEDURE — 85018 HEMOGLOBIN: CPT

## 2020-04-18 PROCEDURE — 80053 COMPREHEN METABOLIC PANEL: CPT

## 2020-04-18 PROCEDURE — 85520 HEPARIN ASSAY: CPT

## 2020-04-18 PROCEDURE — 99156 MOD SED OTH PHYS/QHP 5/>YRS: CPT

## 2020-04-18 PROCEDURE — 80048 BASIC METABOLIC PNL TOTAL CA: CPT

## 2020-04-18 RX ADMIN — HEPARIN SODIUM PRN MLS/HR: 10000 INJECTION, SOLUTION INTRAVENOUS at 14:30

## 2020-04-18 RX ADMIN — LABETALOL HYDROCHLORIDE ONE MG: 5 INJECTION INTRAVENOUS at 13:30

## 2020-04-18 RX ADMIN — SODIUM CHLORIDE SCH MLS/HR: 0.9 INJECTION, SOLUTION INTRAVENOUS at 15:54

## 2020-04-18 RX ADMIN — LABETALOL HYDROCHLORIDE ONE MG: 5 INJECTION INTRAVENOUS at 13:49

## 2020-04-18 RX ADMIN — ATORVASTATIN CALCIUM SCH MG: 40 TABLET, FILM COATED ORAL at 21:59

## 2020-04-18 RX ADMIN — CARVEDILOL SCH MG: 3.12 TABLET, FILM COATED ORAL at 17:04

## 2020-04-18 NOTE — NUR
PT BRAYDEN FROM [facility]. PT STATES HE WAS HAULING HEAVY TRASH 2 DAYS AGO, PT 
STATES AT MIDNIGHT THAT EVENING HE BEGAN TO HAVE STERNAL CHEST PAIN RADIATING 
TO BILATERAL ARMS AND JAW. PAIN HAS BEEN INTERMITTENT SINCE THEN, PT STATES 
THAT LAST NIGHT BETWEEN [id number] PAIN INTENSIFIED SO HE PRESENTED TO [facility] FOR WORKUP. PT GIVEN 80MG LOVENOX  MG ASA AT [facility] THIS 
AM. PT NOW DENIES PAIN. PT HAS HX MI WITH STENT, PT STATES SYMPTOMS ARE 
SIMILAR. EKG OBTAINED BY EDT ON ARRIVAL, PT ATTACHED TO ALL MONITORS. CALL 
LIGHT IN REACH. REPORT GIVEN TO TYLER AVALOS AT BEDSIDE.

## 2020-04-18 NOTE — NUR
Break RN. Pt Transfered to Floor with tech on monitor. Pt has all own 
belingings upon time of transfer.

## 2020-04-19 VITALS — SYSTOLIC BLOOD PRESSURE: 95 MMHG | DIASTOLIC BLOOD PRESSURE: 59 MMHG

## 2020-04-19 VITALS — DIASTOLIC BLOOD PRESSURE: 69 MMHG | SYSTOLIC BLOOD PRESSURE: 112 MMHG

## 2020-04-19 VITALS — DIASTOLIC BLOOD PRESSURE: 64 MMHG | SYSTOLIC BLOOD PRESSURE: 111 MMHG

## 2020-04-19 VITALS — DIASTOLIC BLOOD PRESSURE: 58 MMHG | SYSTOLIC BLOOD PRESSURE: 95 MMHG

## 2020-04-19 VITALS — SYSTOLIC BLOOD PRESSURE: 107 MMHG | DIASTOLIC BLOOD PRESSURE: 62 MMHG

## 2020-04-19 VITALS — DIASTOLIC BLOOD PRESSURE: 67 MMHG | SYSTOLIC BLOOD PRESSURE: 106 MMHG

## 2020-04-19 VITALS — SYSTOLIC BLOOD PRESSURE: 116 MMHG | DIASTOLIC BLOOD PRESSURE: 73 MMHG

## 2020-04-19 VITALS — SYSTOLIC BLOOD PRESSURE: 117 MMHG | DIASTOLIC BLOOD PRESSURE: 76 MMHG

## 2020-04-19 LAB
ALBUMIN SERPL-MCNC: 3.1 G/DL (ref 3.4–5)
ALP SERPL-CCNC: 63 U/L (ref 45–117)
ALT SERPL-CCNC: 58 U/L (ref 12–78)
ANION GAP SERPL CALC-SCNC: 8 MMOL/L (ref 5–15)
BASOPHILS # BLD AUTO: 0.05 X10^3/UL (ref 0–0.1)
BASOPHILS NFR BLD AUTO: 1 % (ref 0–1)
BILIRUB SERPL-MCNC: 0.3 MG/DL (ref 0.2–1)
CALCIUM SERPL-MCNC: 8.9 MG/DL (ref 8.5–10.1)
CHLORIDE SERPL-SCNC: 108 MMOL/L (ref 98–107)
CREAT SERPL-MCNC: 0.83 MG/DL (ref 0.7–1.3)
EOSINOPHIL # BLD AUTO: 0.15 X10^3/UL (ref 0–0.4)
EOSINOPHIL NFR BLD AUTO: 1 % (ref 1–7)
ERYTHROCYTE [DISTWIDTH] IN BLOOD BY AUTOMATED COUNT: 14.7 % (ref 9.4–14.8)
LYMPHOCYTES # BLD AUTO: 2.04 X10^3/UL (ref 1–3.4)
LYMPHOCYTES NFR BLD AUTO: 20 % (ref 22–44)
MCH RBC QN AUTO: 31.3 PG (ref 27.5–34.5)
MCHC RBC AUTO-ENTMCNC: 32.9 G/DL (ref 33.2–36.2)
MCV RBC AUTO: 95.2 FL (ref 81–97)
MD: NO
MONOCYTES # BLD AUTO: 0.63 X10^3/UL (ref 0.2–0.8)
MONOCYTES NFR BLD AUTO: 6 % (ref 2–9)
NEUTROPHILS # BLD AUTO: 7.58 X10^3/UL (ref 1.8–6.8)
NEUTROPHILS NFR BLD AUTO: 73 % (ref 42–75)
PLATELET # BLD AUTO: 207 X10^3/UL (ref 130–400)
PMV BLD AUTO: 9.5 FL (ref 7.4–10.4)
PROT SERPL-MCNC: 6.3 G/DL (ref 6.4–8.2)
RBC # BLD AUTO: 4.78 X10^6/UL (ref 4.38–5.82)

## 2020-04-19 RX ADMIN — OMEPRAZOLE SCH MG: 20 CAPSULE, DELAYED RELEASE ORAL at 08:59

## 2020-04-19 RX ADMIN — OMEPRAZOLE SCH MG: 20 CAPSULE, DELAYED RELEASE ORAL at 20:11

## 2020-04-19 RX ADMIN — CARVEDILOL SCH MG: 3.12 TABLET, FILM COATED ORAL at 06:00

## 2020-04-19 RX ADMIN — ASPIRIN SCH MG: 325 TABLET, DELAYED RELEASE ORAL at 06:00

## 2020-04-19 RX ADMIN — ATORVASTATIN CALCIUM SCH MG: 40 TABLET, FILM COATED ORAL at 20:11

## 2020-04-19 RX ADMIN — SODIUM CHLORIDE SCH MLS/HR: 0.9 INJECTION, SOLUTION INTRAVENOUS at 08:59

## 2020-04-19 RX ADMIN — DOCUSATE SODIUM 50MG AND SENNOSIDES 8.6MG SCH TAB: 8.6; 5 TABLET, FILM COATED ORAL at 09:01

## 2020-04-19 RX ADMIN — OMEPRAZOLE SCH MG: 20 CAPSULE, DELAYED RELEASE ORAL at 02:13

## 2020-04-19 RX ADMIN — HEPARIN SODIUM PRN MLS/HR: 10000 INJECTION, SOLUTION INTRAVENOUS at 09:06

## 2020-04-19 RX ADMIN — CARVEDILOL SCH MG: 3.12 TABLET, FILM COATED ORAL at 17:29

## 2020-04-20 VITALS — DIASTOLIC BLOOD PRESSURE: 59 MMHG | SYSTOLIC BLOOD PRESSURE: 110 MMHG

## 2020-04-20 VITALS — SYSTOLIC BLOOD PRESSURE: 122 MMHG | DIASTOLIC BLOOD PRESSURE: 74 MMHG

## 2020-04-20 VITALS — SYSTOLIC BLOOD PRESSURE: 103 MMHG | DIASTOLIC BLOOD PRESSURE: 59 MMHG

## 2020-04-20 VITALS — DIASTOLIC BLOOD PRESSURE: 60 MMHG | SYSTOLIC BLOOD PRESSURE: 105 MMHG

## 2020-04-20 VITALS — SYSTOLIC BLOOD PRESSURE: 115 MMHG | DIASTOLIC BLOOD PRESSURE: 67 MMHG

## 2020-04-20 LAB
ALBUMIN SERPL-MCNC: 3 G/DL (ref 3.4–5)
ALP SERPL-CCNC: 73 U/L (ref 45–117)
ALT SERPL-CCNC: 54 U/L (ref 12–78)
ANION GAP SERPL CALC-SCNC: 4 MMOL/L (ref 5–15)
BILIRUB SERPL-MCNC: 0.5 MG/DL (ref 0.2–1)
CALCIUM SERPL-MCNC: 8.6 MG/DL (ref 8.5–10.1)
CHLORIDE SERPL-SCNC: 108 MMOL/L (ref 98–107)
CREAT SERPL-MCNC: 0.8 MG/DL (ref 0.7–1.3)
PROT SERPL-MCNC: 6.2 G/DL (ref 6.4–8.2)
TROPONIN I SERPL-MCNC: 3.19 NG/ML (ref 0–0.04)

## 2020-04-20 PROCEDURE — 027034Z DILATION OF CORONARY ARTERY, ONE ARTERY WITH DRUG-ELUTING INTRALUMINAL DEVICE, PERCUTANEOUS APPROACH: ICD-10-PCS | Performed by: INTERNAL MEDICINE

## 2020-04-20 PROCEDURE — 4A023N7 MEASUREMENT OF CARDIAC SAMPLING AND PRESSURE, LEFT HEART, PERCUTANEOUS APPROACH: ICD-10-PCS | Performed by: INTERNAL MEDICINE

## 2020-04-20 PROCEDURE — B2111ZZ FLUOROSCOPY OF MULTIPLE CORONARY ARTERIES USING LOW OSMOLAR CONTRAST: ICD-10-PCS | Performed by: INTERNAL MEDICINE

## 2020-04-20 RX ADMIN — OMEPRAZOLE SCH MG: 20 CAPSULE, DELAYED RELEASE ORAL at 08:39

## 2020-04-20 RX ADMIN — ATORVASTATIN CALCIUM SCH MG: 40 TABLET, FILM COATED ORAL at 21:16

## 2020-04-20 RX ADMIN — HEPARIN SODIUM PRN MLS/HR: 10000 INJECTION, SOLUTION INTRAVENOUS at 08:38

## 2020-04-20 RX ADMIN — OMEPRAZOLE SCH MG: 20 CAPSULE, DELAYED RELEASE ORAL at 21:16

## 2020-04-20 RX ADMIN — ASPIRIN SCH MG: 81 TABLET, COATED ORAL at 13:52

## 2020-04-20 RX ADMIN — SODIUM CHLORIDE SCH MLS/HR: 0.9 INJECTION, SOLUTION INTRAVENOUS at 02:16

## 2020-04-20 RX ADMIN — SODIUM CHLORIDE SCH MLS/HR: 0.9 INJECTION, SOLUTION INTRAVENOUS at 17:38

## 2020-04-20 RX ADMIN — DOCUSATE SODIUM 50MG AND SENNOSIDES 8.6MG SCH TAB: 8.6; 5 TABLET, FILM COATED ORAL at 08:39

## 2020-04-20 RX ADMIN — CARVEDILOL SCH MG: 3.12 TABLET, FILM COATED ORAL at 05:29

## 2020-04-20 RX ADMIN — CARVEDILOL SCH MG: 3.12 TABLET, FILM COATED ORAL at 17:35

## 2020-04-20 RX ADMIN — TICAGRELOR SCH MG: 90 TABLET ORAL at 21:16

## 2020-04-20 RX ADMIN — ASPIRIN SCH MG: 325 TABLET, DELAYED RELEASE ORAL at 05:29

## 2020-04-21 VITALS — DIASTOLIC BLOOD PRESSURE: 72 MMHG | SYSTOLIC BLOOD PRESSURE: 110 MMHG

## 2020-04-21 VITALS — SYSTOLIC BLOOD PRESSURE: 124 MMHG | DIASTOLIC BLOOD PRESSURE: 69 MMHG

## 2020-04-21 LAB
ANION GAP SERPL CALC-SCNC: 5 MMOL/L (ref 5–15)
CALCIUM SERPL-MCNC: 9.2 MG/DL (ref 8.5–10.1)
CHLORIDE SERPL-SCNC: 108 MMOL/L (ref 98–107)
CREAT SERPL-MCNC: 0.78 MG/DL (ref 0.7–1.3)

## 2020-04-21 RX ADMIN — CARVEDILOL SCH MG: 3.12 TABLET, FILM COATED ORAL at 05:29

## 2020-04-21 RX ADMIN — ASPIRIN SCH MG: 81 TABLET, COATED ORAL at 08:37

## 2020-04-21 RX ADMIN — OMEPRAZOLE SCH MG: 20 CAPSULE, DELAYED RELEASE ORAL at 08:36

## 2020-04-21 RX ADMIN — SODIUM CHLORIDE SCH MLS/HR: 0.9 INJECTION, SOLUTION INTRAVENOUS at 11:20

## 2020-04-21 RX ADMIN — DOCUSATE SODIUM 50MG AND SENNOSIDES 8.6MG SCH TAB: 8.6; 5 TABLET, FILM COATED ORAL at 08:36

## 2020-04-21 RX ADMIN — TICAGRELOR SCH MG: 90 TABLET ORAL at 08:36

## 2020-05-29 ENCOUNTER — TELEPHONE (OUTPATIENT)
Dept: CARDIOLOGY | Facility: MEDICAL CENTER | Age: 70
End: 2020-05-29

## 2020-05-29 DIAGNOSIS — I25.10 CORONARY ARTERY DISEASE INVOLVING NATIVE CORONARY ARTERY OF NATIVE HEART WITHOUT ANGINA PECTORIS: ICD-10-CM

## 2020-05-29 DIAGNOSIS — I50.20 ACC/AHA STAGE B SYSTOLIC HEART FAILURE (HCC): ICD-10-CM

## 2020-05-29 DIAGNOSIS — E78.2 MIXED HYPERLIPIDEMIA: ICD-10-CM

## 2020-05-29 DIAGNOSIS — I10 ESSENTIAL HYPERTENSION: ICD-10-CM

## 2020-05-29 DIAGNOSIS — Z79.899 HIGH RISK MEDICATION USE: ICD-10-CM

## 2020-05-29 NOTE — TELEPHONE ENCOUNTER
Spoke w/ pt. to notify lab orders will be sent to him via mail. Pt. States he will not be able to complete them prior to appt. because [facility] in [place] is closed. Pt. Also states he was seen in ER, I will request records.

## 2020-06-02 NOTE — TELEPHONE ENCOUNTER
Records request from [facility] for d/c summary, any cardiac related imaging or procedures & EKGs to be sent to [place] [id number].  F: [phone]

## 2020-06-05 ENCOUNTER — OFFICE VISIT (OUTPATIENT)
Dept: CARDIOLOGY | Facility: MEDICAL CENTER | Age: 70
End: 2020-06-05
Payer: MEDICARE

## 2020-06-05 VITALS
HEART RATE: 70 BPM | SYSTOLIC BLOOD PRESSURE: 126 MMHG | OXYGEN SATURATION: 94 % | WEIGHT: 225 LBS | BODY MASS INDEX: 29.82 KG/M2 | HEIGHT: 73 IN | DIASTOLIC BLOOD PRESSURE: 78 MMHG

## 2020-06-05 DIAGNOSIS — I10 ESSENTIAL HYPERTENSION: ICD-10-CM

## 2020-06-05 DIAGNOSIS — I50.20 ACC/AHA STAGE B SYSTOLIC HEART FAILURE (HCC): ICD-10-CM

## 2020-06-05 DIAGNOSIS — E78.2 MIXED HYPERLIPIDEMIA: ICD-10-CM

## 2020-06-05 DIAGNOSIS — I25.10 CORONARY ARTERY DISEASE INVOLVING NATIVE CORONARY ARTERY OF NATIVE HEART WITHOUT ANGINA PECTORIS: ICD-10-CM

## 2020-06-05 DIAGNOSIS — Z79.899 HIGH RISK MEDICATION USE: ICD-10-CM

## 2020-06-05 DIAGNOSIS — Z95.5 STENTED CORONARY ARTERY: ICD-10-CM

## 2020-06-05 DIAGNOSIS — Z95.5 STATUS POST INSERTION OF DRUG ELUTING CORONARY ARTERY STENT: ICD-10-CM

## 2020-06-05 PROCEDURE — 99214 OFFICE O/P EST MOD 30 MIN: CPT | Performed by: NURSE PRACTITIONER

## 2020-06-05 RX ORDER — CARVEDILOL 3.12 MG/1
3.12 TABLET ORAL
COMMUNITY
Start: 2020-05-18 | End: 2021-05-04 | Stop reason: SDUPTHER

## 2020-06-05 RX ORDER — HYDRALAZINE HYDROCHLORIDE 10 MG/1
10 TABLET, FILM COATED ORAL
COMMUNITY
Start: 2020-05-19 | End: 2020-09-08

## 2020-06-05 RX ORDER — NITROGLYCERIN 0.4 MG/1
TABLET SUBLINGUAL
COMMUNITY
Start: 2020-04-21

## 2020-06-05 RX ORDER — TICAGRELOR 90 MG/1
TABLET ORAL
COMMUNITY
Start: 2020-05-19 | End: 2021-05-04

## 2020-06-05 RX ORDER — ATORVASTATIN CALCIUM 40 MG/1
TABLET, FILM COATED ORAL
COMMUNITY
Start: 2020-05-18 | End: 2021-05-04 | Stop reason: SDUPTHER

## 2020-06-05 ASSESSMENT — ENCOUNTER SYMPTOMS
FEVER: 0
COUGH: 0
SHORTNESS OF BREATH: 0
DIZZINESS: 0
PND: 0
PALPITATIONS: 0
MYALGIAS: 0
CLAUDICATION: 0
ABDOMINAL PAIN: 0
ORTHOPNEA: 0

## 2020-06-05 ASSESSMENT — FIBROSIS 4 INDEX: FIB4 SCORE: 1.51

## 2020-06-05 NOTE — PROGRESS NOTES
Chief Complaint   Patient presents with   • Hypertension       Subjective:   Lv Mast is a 70 y.o. male who presents today for follow up on his CAD with his wife, Anna Marie.    Patient of Dr. Echols. He was last seen in clinic 10/30/2019 with Dr. Echols.  During that visit, atorvastatin was stopped for about a month to evaluate his liver/hepatitis panel.  Patient's Brilinta was discontinued.    Patient reports he recently had an hospitalization at [place].  He was transferred from [place] for an NSTEMI.  He received a stent.  He was restarted on atorvastatin and Brilinta.    For his symptoms, he reports fatigue, but otherwise denies chest pain, palpitations, orthopnea, PND, edema or dizziness/lightheadedness.    He would like to participate in cardiac rehab.    He states since COVID-19, he has been highly inactive, but also states he does not exert much because of his knee and hip pain.    Patient hoping to get surgery of his knee soon.    Additonally, patient has the following medical problems:    -History of stage B heart failure with an improvement of his EF.    -CAD, history of prior stent, in 2018 AMELIA x2 to RCA and LAD    -Hypertension    -Hyperlipidemia    -Hypothyroidism: Taking levothyroxine    -Diabetes: Taking glipizide    -Anxiety    -He is retired     Past Medical History:   Diagnosis Date   • Anxiety    • GERD (gastroesophageal reflux disease)    • Hyperlipidemia    • Hypertension    • Hypothyroidism      Past Surgical History:   Procedure Laterality Date   • ZZZ CARDIAC CATH  10/29/2018    Synergy stents to LAD , RCA   • ARTHROSCOPY, KNEE Left 06/1992   • CATARACT EXTRACTION WITH IOL       Family History   Problem Relation Age of Onset   • Arthritis Mother    • Diabetes Mother    • Hypertension Mother    • Hyperlipidemia Mother    • Stroke Mother    • Cancer Maternal Grandmother         breast cancer   • Stroke Maternal Grandfather    • Stroke Paternal Grandfather 40        CVA   • Lung  Disease Neg Hx    • Genetic Disorder Neg Hx    • Psychiatric Illness Neg Hx    • Heart Disease Neg Hx    • Alcohol/Drug Neg Hx      Social History     Socioeconomic History   • Marital status:      Spouse name: Not on file   • Number of children: Not on file   • Years of education: Not on file   • Highest education level: Not on file   Occupational History   • Not on file   Social Needs   • Financial resource strain: Not on file   • Food insecurity     Worry: Not on file     Inability: Not on file   • Transportation needs     Medical: Not on file     Non-medical: Not on file   Tobacco Use   • Smoking status: Former Smoker     Types: Cigars     Last attempt to quit: 2008     Years since quittin.4   • Smokeless tobacco: Never Used   Substance and Sexual Activity   • Alcohol use: No   • Drug use: No   • Sexual activity: Not on file   Lifestyle   • Physical activity     Days per week: Not on file     Minutes per session: Not on file   • Stress: Not on file   Relationships   • Social connections     Talks on phone: Not on file     Gets together: Not on file     Attends [employer] service: Not on file     Active member of club or organization: Not on file     Attends meetings of clubs or organizations: Not on file     Relationship status: Not on file   • Intimate partner violence     Fear of current or ex partner: Not on file     Emotionally abused: Not on file     Physically abused: Not on file     Forced sexual activity: Not on file   Other Topics Concern   • Not on file   Social History Narrative   • Not on file     No Known Allergies  Outpatient Encounter Medications as of 2020   Medication Sig Dispense Refill   • nitroglycerin (NITROSTAT) 0.4 MG SL Tab place 1 tablet under the tongue every 5 minutes if needed for chest pain     • BRILINTA 90 MG Tab tablet TAKE 1 TABLET BY MOUTH TWICE A DAY     • hydrALAZINE (APRESOLINE) 10 MG Tab Take 10 mg by mouth 3 times a day, with meals.     • carvedilol (COREG)  "3.125 MG Tab Take 3.125 mg by mouth. TWICE A DAY WITH FOOD     • atorvastatin (LIPITOR) 40 MG Tab take 1 tablet by mouth daily WITH BIFFEST MEAL     • losartan (COZAAR) 50 MG Tab take 1 tablet by mouth every evening 90 Tab 2   • ezetimibe (ZETIA) 10 MG Tab Take 10 mg by mouth.  0   • glipiZIDE SR (GLUCOTROL) 2.5 MG TABLET SR 24 HR   0   • levothyroxine (SYNTHROID) 25 MCG Tab   0   • aspirin EC (ECOTRIN) 81 MG Tablet Delayed Response Take 81 mg by mouth every day.     • LORazepam (ATIVAN) 1 MG Tab Take 1 mg by mouth 2 Times a Day.     • omeprazole (PRILOSEC) 20 MG delayed-release capsule Take 20 mg by mouth 2 times a day.     • BOOSTRIX 5-2.5-18.5 LF-MCG/0.5 Suspension inject 0.5 milliliter intramuscularly  0   • [DISCONTINUED] cephALEXin (KEFLEX) 500 MG Cap take 1 capsule by mouth every 6 hours for 10 days  0   • [DISCONTINUED] metoprolol (LOPRESSOR) 25 MG Tab Take 0.5 Tabs by mouth 2 Times a Day. (Patient not taking: Reported on 6/5/2020) 45 Tab 3   • acetaminophen (TYLENOL) 500 MG Tab Take 2,000 mg by mouth every evening as needed (Pain, Sleep).     • [DISCONTINUED] diphenhydrAMINE (BENADRYL) 25 MG Tab Take 50 mg by mouth 1 time daily as needed for Sleep.       No facility-administered encounter medications on file as of 6/5/2020.      Review of Systems   Constitutional: Positive for malaise/fatigue. Negative for fever.   Respiratory: Negative for cough and shortness of breath.    Cardiovascular: Negative for chest pain, palpitations, orthopnea, claudication, leg swelling and PND.   Gastrointestinal: Negative for abdominal pain.   Musculoskeletal: Negative for myalgias.   Neurological: Negative for dizziness.   All other systems reviewed and are negative.       Objective:   /78 (BP Location: Left arm, Patient Position: Sitting)   Pulse 70   Ht 1.854 m (6' 1\")   Wt 102.1 kg (225 lb)   SpO2 94%   BMI 29.69 kg/m²     Physical Exam   Constitutional: He is oriented to person, place, and time. He appears " well-developed and well-nourished.   HENT:   Head: Normocephalic and atraumatic.   Eyes: Pupils are equal, round, and reactive to light. EOM are normal.   Neck: Normal range of motion. Neck supple. No JVD present.   Cardiovascular: Normal rate, regular rhythm and normal heart sounds.   Pulmonary/Chest: Effort normal and breath sounds normal. No respiratory distress. He has no wheezes. He has no rales.   Abdominal: Soft. Bowel sounds are normal.   Musculoskeletal:         General: No edema.   Neurological: He is alert and oriented to person, place, and time.   Skin: Skin is warm and dry.   Psychiatric: He has a normal mood and affect. His behavior is normal.   Vitals reviewed.    Lab Results   Component Value Date/Time    CHOLSTRLTOT 142 03/21/2019 10:56 AM    LDL 80 03/21/2019 10:56 AM    HDL 35 (L) 03/21/2019 10:56 AM    TRIGLYCERIDE 136 03/21/2019 10:56 AM       Lab Results   Component Value Date/Time    SODIUM 141 03/21/2019 10:56 AM    SODIUM 135 10/31/2018 10:08 AM    POTASSIUM 4.9 03/21/2019 10:56 AM    POTASSIUM 4.5 10/31/2018 10:08 AM    CHLORIDE 104 03/21/2019 10:56 AM    CHLORIDE 106 10/31/2018 10:08 AM    CO2 23 03/21/2019 10:56 AM    CO2 21 10/31/2018 10:08 AM    GLUCOSE 117 (H) 03/21/2019 10:56 AM    GLUCOSE 192 (H) 10/31/2018 10:08 AM    BUN 13 03/21/2019 10:56 AM    BUN 14 10/31/2018 10:08 AM    CREATININE 1.04 03/21/2019 10:56 AM    CREATININE 0.86 10/31/2018 10:08 AM    BUNCREATRAT 13 03/21/2019 10:56 AM     Lab Results   Component Value Date/Time    ALKPHOSPHAT 115 03/21/2019 10:56 AM    ASTSGOT 38 03/21/2019 10:56 AM    ALTSGPT 132 (H) 03/21/2019 10:56 AM    TBILIRUBIN 0.3 03/21/2019 10:56 AM      Transthoracic Echo Report 10/30/2018  No prior study is available for comparison.   Normal left ventricular chamber size.  Mildly reduced left ventricular systolic function. Left ventricular ejection fraction is visually estimated to be 45%. Wall motion is difficult to assess with the limitations of  image quality. Consider contrast study to better evaluate for wall motion abnormality and LVEF.  Technically difficult study incomplete information is obtained    Transthoracic Echo Report 6/6/2019  Prior echo on 10/30/18, no significant changes are noted.  Left ventricular ejection fraction is visually estimated to be 70%.  No valvular heart disease.  Unable to estimate pulmonary artery pressure due to an inadequate tricuspid regurgitant jet.    Assessment:     1. Coronary artery disease involving native coronary artery of native heart without angina pectoris  REFERRAL TO INTENSIVE CARDIAC REHAB/CARDIAC REHAB   2. Stented coronary artery  REFERRAL TO INTENSIVE CARDIAC REHAB/CARDIAC REHAB   3. Status post insertion of drug eluting coronary artery stent     4. Essential hypertension     5. Mixed hyperlipidemia     6. ACC/AHA stage B systolic heart failure (HCC)     7. High risk medication use         Medical Decision Making:  Today's Assessment / Status / Plan:   1.  CAD, history of AMELIA x2 to RCA and LAD, recent stent placed at [facility]:  -Will request records from [facility] in April  -Will follow-up with patient if changes recommended  -Continue atorvastatin 40 mg daily, will follow liver function  -Continue aspirin 81 mg daily  -Continue Brilinta 90 mg twice a day  -Continue ezetimibe 10 mg daily  -Continue carvedilol 3.125 mg twice a day  -We will refer to cardiac rehab    2.  Hypertension: Stable  -Continue carvedilol 3.125 mg twice a day  -Continue hydralazine 10 mg 3 times a day  -Continue losartan 50 mg daily    3.  Hyperlipidemia:  -Last LDL 80 on 3/21/2018  -Continue atorvastatin, ezetimibe per above  -We will have patient repeat follow-up CMP and lipid panel before next visit    4. HFmrEF, Stage B, Class 1, LVEF 70% (EF recovered): Based on physical examination findings, patient is euvolemic. No JVD, lungs are clear to auscultation, no pitting   -Continue recommendations per above  -Reinforced  s/sx of worsening heart failure with patient and weight monitoring. Pt verbalizes understanding. Pt to call office or RTC if present.     Obtain a CMP and lipid panel before next visit in 3 months    FU in clinic in 3 months with Dr. Echols. Sooner if needed.    Patient verbalizes understanding and agrees with the plan of care.     Collaborating MD: LISSETT Honeycutt MD    PLEASE NOTE: This Note was created using voice recognition Software

## 2020-06-08 ENCOUNTER — TELEPHONE (OUTPATIENT)
Dept: CARDIOLOGY | Facility: MEDICAL CENTER | Age: 70
End: 2020-06-08

## 2020-06-08 NOTE — TELEPHONE ENCOUNTER
Called to let patient know that Tianna Estrada has received medical records records from [place] and she had review it  to continue the plan as discussed on Friday. Could not leave message voicemail not set up.

## 2020-09-08 ENCOUNTER — OFFICE VISIT (OUTPATIENT)
Dept: CARDIOLOGY | Facility: MEDICAL CENTER | Age: 70
End: 2020-09-08
Payer: MEDICARE

## 2020-09-08 VITALS
HEIGHT: 73 IN | RESPIRATION RATE: 20 BRPM | WEIGHT: 208 LBS | SYSTOLIC BLOOD PRESSURE: 104 MMHG | BODY MASS INDEX: 27.57 KG/M2 | HEART RATE: 100 BPM | OXYGEN SATURATION: 90 % | DIASTOLIC BLOOD PRESSURE: 62 MMHG

## 2020-09-08 DIAGNOSIS — K21.9 GASTROESOPHAGEAL REFLUX DISEASE WITHOUT ESOPHAGITIS: ICD-10-CM

## 2020-09-08 DIAGNOSIS — Z79.899 HIGH RISK MEDICATION USE: ICD-10-CM

## 2020-09-08 DIAGNOSIS — I10 ESSENTIAL HYPERTENSION: ICD-10-CM

## 2020-09-08 DIAGNOSIS — I25.10 CORONARY ARTERY DISEASE INVOLVING NATIVE CORONARY ARTERY OF NATIVE HEART WITHOUT ANGINA PECTORIS: ICD-10-CM

## 2020-09-08 DIAGNOSIS — R74.01 TRANSAMINITIS: ICD-10-CM

## 2020-09-08 DIAGNOSIS — I50.20 ACC/AHA STAGE B SYSTOLIC HEART FAILURE (HCC): ICD-10-CM

## 2020-09-08 DIAGNOSIS — Z95.5 STATUS POST INSERTION OF DRUG ELUTING CORONARY ARTERY STENT: ICD-10-CM

## 2020-09-08 DIAGNOSIS — I21.4 NSTEMI (NON-ST ELEVATED MYOCARDIAL INFARCTION) (HCC): ICD-10-CM

## 2020-09-08 DIAGNOSIS — E78.2 MIXED HYPERLIPIDEMIA: ICD-10-CM

## 2020-09-08 PROCEDURE — 99214 OFFICE O/P EST MOD 30 MIN: CPT | Performed by: INTERNAL MEDICINE

## 2020-09-08 RX ORDER — LEVOTHYROXINE SODIUM 0.05 MG/1
TABLET ORAL
COMMUNITY
Start: 2020-06-19 | End: 2020-11-04

## 2020-09-08 RX ORDER — OMEPRAZOLE 40 MG/1
40 CAPSULE, DELAYED RELEASE ORAL 2 TIMES DAILY
COMMUNITY
Start: 2020-07-25

## 2020-09-08 ASSESSMENT — ENCOUNTER SYMPTOMS
HEMOPTYSIS: 0
CARDIOVASCULAR NEGATIVE: 1
CONSTITUTIONAL NEGATIVE: 1
WHEEZING: 0
PALPITATIONS: 0
SHORTNESS OF BREATH: 0
RESPIRATORY NEGATIVE: 1
BRUISES/BLEEDS EASILY: 0
SORE THROAT: 0
LOSS OF CONSCIOUSNESS: 0
CHILLS: 0
MUSCULOSKELETAL NEGATIVE: 1
PND: 0
WEAKNESS: 0
FEVER: 0
GASTROINTESTINAL NEGATIVE: 1
CLAUDICATION: 0
DIZZINESS: 1
COUGH: 0
EYES NEGATIVE: 1
ORTHOPNEA: 0
STRIDOR: 0
SPUTUM PRODUCTION: 0

## 2020-09-08 ASSESSMENT — FIBROSIS 4 INDEX: FIB4 SCORE: 1.51

## 2020-09-08 NOTE — PROGRESS NOTES
Chief Complaint   Patient presents with   • HTN (Controlled)   • Coronary Artery Disease   • Congestive Heart Failure   • MI (Non ST Segment Elevation MI)       Subjective:   Lv Mast is a 70 y.o. male who presents today as a follow-up for his CAD.  Since he was last seen he was admitted to hospital in [place] with symptoms of chest pain.  Is transferred to [place] because [facility] was on divert.  He had a stent placed for 20 and a circumflex.  Since then he is been having issues with disequilibrium and dizziness.  He is on carvedilol losartan and hydralazine.  The reason for hydralazine is somewhat unclear.  He is having no chest pain.  He has no shortness of breath.  He is having symptoms of dizziness when he bends up from being leaned over.    Past Medical History:   Diagnosis Date   • Anxiety    • GERD (gastroesophageal reflux disease)    • Hyperlipidemia    • Hypertension    • Hypothyroidism      Past Surgical History:   Procedure Laterality Date   • ZZZ CARDIAC CATH  10/29/2018    Synergy stents to LAD , RCA   • ARTHROSCOPY, KNEE Left 06/1992   • CATARACT EXTRACTION WITH IOL       Family History   Problem Relation Age of Onset   • Arthritis Mother    • Diabetes Mother    • Hypertension Mother    • Hyperlipidemia Mother    • Stroke Mother    • Cancer Maternal Grandmother         breast cancer   • Stroke Maternal Grandfather    • Stroke Paternal Grandfather 40        CVA   • Lung Disease Neg Hx    • Genetic Disorder Neg Hx    • Psychiatric Illness Neg Hx    • Heart Disease Neg Hx    • Alcohol/Drug Neg Hx      Social History     Socioeconomic History   • Marital status:      Spouse name: Not on file   • Number of children: Not on file   • Years of education: Not on file   • Highest education level: Not on file   Occupational History   • Not on file   Social Needs   • Financial resource strain: Not on file   • Food insecurity     Worry: Not on file     Inability: Not on file   • Transportation needs  "    Medical: Not on file     Non-medical: Not on file   Tobacco Use   • Smoking status: Former Smoker     Types: Cigars     Quit date:      Years since quittin.6   • Smokeless tobacco: Never Used   Substance and Sexual Activity   • Alcohol use: No   • Drug use: No   • Sexual activity: Not on file   Lifestyle   • Physical activity     Days per week: Not on file     Minutes per session: Not on file   • Stress: Not on file   Relationships   • Social connections     Talks on phone: Not on file     Gets together: Not on file     Attends Rastafarian service: Not on file     Active member of club or organization: Not on file     Attends meetings of clubs or organizations: Not on file     Relationship status: Not on file   • Intimate partner violence     Fear of current or ex partner: Not on file     Emotionally abused: Not on file     Physically abused: Not on file     Forced sexual activity: Not on file   Other Topics Concern   • Not on file   Social History Narrative   • Not on file     Allergies   Allergen Reactions   • Sulfa Drugs Unspecified     \"does not do well\" per pt family      Outpatient Encounter Medications as of 2020   Medication Sig Dispense Refill   • omeprazole (PRILOSEC) 40 MG delayed-release capsule Take 40 mg by mouth.     • nitroglycerin (NITROSTAT) 0.4 MG SL Tab place 1 tablet under the tongue every 5 minutes if needed for chest pain     • BRILINTA 90 MG Tab tablet TAKE 1 TABLET BY MOUTH TWICE A DAY     • carvedilol (COREG) 3.125 MG Tab Take 3.125 mg by mouth. TWICE A DAY WITH FOOD     • atorvastatin (LIPITOR) 40 MG Tab take 1 tablet by mouth daily WITH BIFFEST MEAL     • losartan (COZAAR) 50 MG Tab take 1 tablet by mouth every evening 90 Tab 2   • ezetimibe (ZETIA) 10 MG Tab Take 10 mg by mouth.  0   • glipiZIDE SR (GLUCOTROL) 2.5 MG TABLET SR 24 HR   0   • BOOSTRIX 5-2.5-18.5 LF-MCG/0.5 Suspension inject 0.5 milliliter intramuscularly  0   • aspirin EC (ECOTRIN) 81 MG Tablet Delayed " "Response Take 81 mg by mouth every day.     • LORazepam (ATIVAN) 1 MG Tab Take 1 mg by mouth 2 Times a Day.     • acetaminophen (TYLENOL) 500 MG Tab Take 2,000 mg by mouth every evening as needed (Pain, Sleep).     • levothyroxine (SYNTHROID) 50 MCG Tab take 1 tablet by mouth every morning AN EMPTY STOMACH     • [DISCONTINUED] hydrALAZINE (APRESOLINE) 10 MG Tab Take 10 mg by mouth 3 times a day, with meals.     • levothyroxine (SYNTHROID) 25 MCG Tab   0   • [DISCONTINUED] omeprazole (PRILOSEC) 20 MG delayed-release capsule Take 20 mg by mouth 2 times a day.       No facility-administered encounter medications on file as of 9/8/2020.      Review of Systems   Constitutional: Negative.  Negative for chills, fever and malaise/fatigue.   HENT: Negative.  Negative for sore throat.    Eyes: Negative.    Respiratory: Negative.  Negative for cough, hemoptysis, sputum production, shortness of breath, wheezing and stridor.    Cardiovascular: Negative.  Negative for chest pain, palpitations, orthopnea, claudication, leg swelling and PND.   Gastrointestinal: Negative.    Genitourinary: Negative.    Musculoskeletal: Negative.    Skin: Negative.    Neurological: Positive for dizziness. Negative for loss of consciousness and weakness.   Endo/Heme/Allergies: Negative.  Does not bruise/bleed easily.   All other systems reviewed and are negative.       Objective:   /62 (BP Location: Left arm, Patient Position: Sitting, BP Cuff Size: Adult)   Pulse 100   Resp 20   Ht 1.854 m (6' 1\")   Wt 94.3 kg (208 lb)   SpO2 90%   BMI 27.44 kg/m²     Physical Exam   Constitutional: He appears well-developed and well-nourished. No distress.   HENT:   Head: Normocephalic and atraumatic.   Right Ear: External ear normal.   Left Ear: External ear normal.   Nose: Nose normal.   Mouth/Throat: No oropharyngeal exudate.   Eyes: Pupils are equal, round, and reactive to light. Conjunctivae and EOM are normal. Right eye exhibits no discharge. Left " eye exhibits no discharge. No scleral icterus.   Neck: Neck supple. No JVD present.   Cardiovascular: Normal rate, regular rhythm and intact distal pulses. Exam reveals no gallop and no friction rub.   No murmur heard.  Pulmonary/Chest: Effort normal. No stridor. No respiratory distress. He has no wheezes. He has no rales. He exhibits no tenderness.   Abdominal: Soft. He exhibits no distension. There is no guarding.   Musculoskeletal: Normal range of motion.         General: No tenderness, deformity or edema.   Neurological: He is alert. He has normal reflexes. He displays normal reflexes. No cranial nerve deficit. He exhibits normal muscle tone. Coordination normal.   Skin: Skin is warm and dry. No rash noted. He is not diaphoretic. No erythema. No pallor.   Psychiatric: He has a normal mood and affect. His behavior is normal. Judgment and thought content normal.   Nursing note and vitals reviewed.    Outside medical records: Outside medical records from [place] reviewed showing circumflex stent placed normal echocardiogram normal labs.  Scanned in media.    Echocardiogram: Dated 6/6/2019 personally interpreted myself showing an EF of 70% otherwise normal.    Echocardiogram: Dated 10/29/2018 personally reviewed interpreted myself showing EF of 45.      Assessment:     1. Status post insertion of drug eluting coronary artery stent     2. Transaminitis  Lipid Profile   3. NSTEMI (non-ST elevated myocardial infarction) (HCC)  Basic Metabolic Panel    CBC W/ DIFF W/O PLATELETS    Lipid Profile   4. Mixed hyperlipidemia  Basic Metabolic Panel    CBC W/ DIFF W/O PLATELETS    Lipid Profile   5. High risk medication use  CBC W/ DIFF W/O PLATELETS    Lipid Profile   6. Gastroesophageal reflux disease without esophagitis  Basic Metabolic Panel    CBC W/ DIFF W/O PLATELETS   7. Essential hypertension  Basic Metabolic Panel    CBC W/ DIFF W/O PLATELETS   8. Coronary artery disease involving native coronary artery of native  heart without angina pectoris     9. ACC/AHA stage B systolic heart failure (HCC)         Medical Decision Making:  Today's Assessment / Status / Plan:     70-year-old male with CAD status post stent recently placed to the circumflex artery.  We will keep him on dual antiplatelet therapy for 1 year.  He will also stay on the aspirin and Zetia.  I think that his blood pressure is running too low and therefore we will stop the hydralazine.  He will check his blood pressures twice per day.  We will check a number of labs for his high risk medication usage.  We will see him back in 2 months for further blood pressure check and see how he is feeling with his symptoms and disequilibrium.

## 2020-11-02 ENCOUNTER — TELEPHONE (OUTPATIENT)
Dept: CARDIOLOGY | Facility: MEDICAL CENTER | Age: 70
End: 2020-11-02

## 2020-11-02 NOTE — TELEPHONE ENCOUNTER
Attempted to called patient reminder to please have labs done prior to her his appointment no answer could not leave message Voicemail not set up.

## 2020-11-04 ENCOUNTER — OFFICE VISIT (OUTPATIENT)
Dept: CARDIOLOGY | Facility: MEDICAL CENTER | Age: 70
End: 2020-11-04
Payer: MEDICARE

## 2020-11-04 VITALS
OXYGEN SATURATION: 96 % | WEIGHT: 209 LBS | DIASTOLIC BLOOD PRESSURE: 62 MMHG | BODY MASS INDEX: 27.7 KG/M2 | SYSTOLIC BLOOD PRESSURE: 122 MMHG | HEIGHT: 73 IN | HEART RATE: 70 BPM

## 2020-11-04 DIAGNOSIS — Z95.5 STATUS POST INSERTION OF DRUG ELUTING CORONARY ARTERY STENT: ICD-10-CM

## 2020-11-04 DIAGNOSIS — I25.10 CORONARY ARTERY DISEASE INVOLVING NATIVE CORONARY ARTERY OF NATIVE HEART WITHOUT ANGINA PECTORIS: ICD-10-CM

## 2020-11-04 DIAGNOSIS — I50.20 ACC/AHA STAGE B SYSTOLIC HEART FAILURE (HCC): ICD-10-CM

## 2020-11-04 DIAGNOSIS — E03.9 ACQUIRED HYPOTHYROIDISM: ICD-10-CM

## 2020-11-04 DIAGNOSIS — Z79.899 HIGH RISK MEDICATION USE: ICD-10-CM

## 2020-11-04 DIAGNOSIS — R74.01 TRANSAMINITIS: ICD-10-CM

## 2020-11-04 DIAGNOSIS — E78.2 MIXED HYPERLIPIDEMIA: ICD-10-CM

## 2020-11-04 DIAGNOSIS — I10 ESSENTIAL HYPERTENSION: ICD-10-CM

## 2020-11-04 DIAGNOSIS — I21.4 NSTEMI (NON-ST ELEVATED MYOCARDIAL INFARCTION) (HCC): ICD-10-CM

## 2020-11-04 PROCEDURE — 99214 OFFICE O/P EST MOD 30 MIN: CPT | Performed by: INTERNAL MEDICINE

## 2020-11-04 RX ORDER — LEVOTHYROXINE SODIUM 0.07 MG/1
TABLET ORAL
COMMUNITY
Start: 2020-10-05

## 2020-11-04 ASSESSMENT — ENCOUNTER SYMPTOMS
FEVER: 0
GASTROINTESTINAL NEGATIVE: 1
CLAUDICATION: 0
WHEEZING: 0
CONSTITUTIONAL NEGATIVE: 1
PND: 0
SPUTUM PRODUCTION: 0
SHORTNESS OF BREATH: 0
WEAKNESS: 0
CHILLS: 0
LOSS OF CONSCIOUSNESS: 0
DIZZINESS: 1
BRUISES/BLEEDS EASILY: 0
PALPITATIONS: 0
EYES NEGATIVE: 1
SORE THROAT: 0
STRIDOR: 0
ORTHOPNEA: 0
CARDIOVASCULAR NEGATIVE: 1
COUGH: 0
HEMOPTYSIS: 0
RESPIRATORY NEGATIVE: 1
MUSCULOSKELETAL NEGATIVE: 1

## 2020-11-04 NOTE — PROGRESS NOTES
Chief Complaint   Patient presents with   • HTN (Controlled)   • Coronary Artery Disease       Subjective:   Lv Mast is a 70 y.o. male who presents today as a follow-up for his CAD.  Since he was last seen he was admitted to hospital in [place] with symptoms of chest pain.  Is transferred to [place] because [facility] was on divert.  He had a stent placed in the circumflex.    He was last seen we stopped his hydralazine due to symptomatic dizziness.  That is since resolved. He continues on dual antiplatelet therapy.  His lipids were checked which showed an LDL of 71.  His AST remains normal at 25 with an elevated ALT at 62.  His blood pressures been controlled.  Is having no chest pain or shortness of breath.      Past Medical History:   Diagnosis Date   • Anxiety    • GERD (gastroesophageal reflux disease)    • Hyperlipidemia    • Hypertension    • Hypothyroidism      Past Surgical History:   Procedure Laterality Date   • ZZZ CARDIAC CATH  10/29/2018    Synergy stents to LAD , RCA   • ARTHROSCOPY, KNEE Left 06/1992   • CATARACT EXTRACTION WITH IOL       Family History   Problem Relation Age of Onset   • Arthritis Mother    • Diabetes Mother    • Hypertension Mother    • Hyperlipidemia Mother    • Stroke Mother    • Cancer Maternal Grandmother         breast cancer   • Stroke Maternal Grandfather    • Stroke Paternal Grandfather 40        CVA   • Lung Disease Neg Hx    • Genetic Disorder Neg Hx    • Psychiatric Illness Neg Hx    • Heart Disease Neg Hx    • Alcohol/Drug Neg Hx      Social History     Socioeconomic History   • Marital status:      Spouse name: Not on file   • Number of children: Not on file   • Years of education: Not on file   • Highest education level: Not on file   Occupational History   • Not on file   Social Needs   • Financial resource strain: Not on file   • Food insecurity     Worry: Not on file     Inability: Not on file   • Transportation needs     Medical: Not on file      "Non-medical: Not on file   Tobacco Use   • Smoking status: Former Smoker     Types: Cigars     Quit date:      Years since quittin.8   • Smokeless tobacco: Never Used   Substance and Sexual Activity   • Alcohol use: No   • Drug use: No   • Sexual activity: Not on file   Lifestyle   • Physical activity     Days per week: Not on file     Minutes per session: Not on file   • Stress: Not on file   Relationships   • Social connections     Talks on phone: Not on file     Gets together: Not on file     Attends Anabaptist service: Not on file     Active member of club or organization: Not on file     Attends meetings of clubs or organizations: Not on file     Relationship status: Not on file   • Intimate partner violence     Fear of current or ex partner: Not on file     Emotionally abused: Not on file     Physically abused: Not on file     Forced sexual activity: Not on file   Other Topics Concern   • Not on file   Social History Narrative   • Not on file     Allergies   Allergen Reactions   • Sulfa Drugs Unspecified     \"does not do well\" per pt family      Outpatient Encounter Medications as of 2020   Medication Sig Dispense Refill   • levothyroxine (SYNTHROID) 75 MCG Tab take 1 tablet by mouth every morning AN EMPTY STOMACH     • omeprazole (PRILOSEC) 40 MG delayed-release capsule Take 40 mg by mouth.     • nitroglycerin (NITROSTAT) 0.4 MG SL Tab place 1 tablet under the tongue every 5 minutes if needed for chest pain     • BRILINTA 90 MG Tab tablet TAKE 1 TABLET BY MOUTH TWICE A DAY     • carvedilol (COREG) 3.125 MG Tab Take 3.125 mg by mouth. TWICE A DAY WITH FOOD     • atorvastatin (LIPITOR) 40 MG Tab take 1 tablet by mouth daily WITH BIFFEST MEAL     • losartan (COZAAR) 50 MG Tab take 1 tablet by mouth every evening 90 Tab 2   • ezetimibe (ZETIA) 10 MG Tab Take 10 mg by mouth.  0   • glipiZIDE SR (GLUCOTROL) 2.5 MG TABLET SR 24 HR   0   • aspirin EC (ECOTRIN) 81 MG Tablet Delayed Response Take 81 mg by " "mouth every day.     • LORazepam (ATIVAN) 1 MG Tab Take 1 mg by mouth 2 Times a Day.     • acetaminophen (TYLENOL) 500 MG Tab Take 2,000 mg by mouth every evening as needed (Pain, Sleep).     • [DISCONTINUED] levothyroxine (SYNTHROID) 50 MCG Tab take 1 tablet by mouth every morning AN EMPTY STOMACH     • BOOSTRIX 5-2.5-18.5 LF-MCG/0.5 Suspension inject 0.5 milliliter intramuscularly  0   • [DISCONTINUED] levothyroxine (SYNTHROID) 25 MCG Tab   0     No facility-administered encounter medications on file as of 11/4/2020.      Review of Systems   Constitutional: Negative.  Negative for chills, fever and malaise/fatigue.   HENT: Negative.  Negative for sore throat.    Eyes: Negative.    Respiratory: Negative.  Negative for cough, hemoptysis, sputum production, shortness of breath, wheezing and stridor.    Cardiovascular: Negative.  Negative for chest pain, palpitations, orthopnea, claudication, leg swelling and PND.   Gastrointestinal: Negative.    Genitourinary: Negative.    Musculoskeletal: Negative.    Skin: Negative.    Neurological: Positive for dizziness. Negative for loss of consciousness and weakness.   Endo/Heme/Allergies: Negative.  Does not bruise/bleed easily.   All other systems reviewed and are negative.       Objective:   /62 (BP Location: Left arm, Patient Position: Sitting, BP Cuff Size: Adult)   Pulse 70   Ht 1.854 m (6' 1\")   Wt 94.8 kg (209 lb)   SpO2 96%   BMI 27.57 kg/m²     Physical Exam   Constitutional: He appears well-developed and well-nourished. No distress.   HENT:   Head: Normocephalic and atraumatic.   Right Ear: External ear normal.   Left Ear: External ear normal.   Nose: Nose normal.   Mouth/Throat: No oropharyngeal exudate.   Eyes: Pupils are equal, round, and reactive to light. Conjunctivae and EOM are normal. Right eye exhibits no discharge. Left eye exhibits no discharge. No scleral icterus.   Neck: Neck supple. No JVD present.   Cardiovascular: Normal rate, regular " rhythm and intact distal pulses. Exam reveals no gallop and no friction rub.   No murmur heard.  Pulmonary/Chest: Effort normal. No stridor. No respiratory distress. He has no wheezes. He has no rales. He exhibits no tenderness.   Abdominal: Soft. He exhibits no distension. There is no guarding.   Musculoskeletal: Normal range of motion.         General: No tenderness, deformity or edema.   Neurological: He is alert. He has normal reflexes. He displays normal reflexes. No cranial nerve deficit. He exhibits normal muscle tone. Coordination normal.   Skin: Skin is warm and dry. No rash noted. He is not diaphoretic. No erythema. No pallor.   Psychiatric: He has a normal mood and affect. His behavior is normal. Judgment and thought content normal.   Nursing note and vitals reviewed.    Outside medical records: Outside medical records from [place] reviewed showing circumflex stent placed normal echocardiogram normal labs.  Scanned in media.    Echocardiogram: Dated 6/6/2019 personally interpreted myself showing an EF of 70% otherwise normal.    Echocardiogram: Dated 10/29/2018 personally reviewed interpreted myself showing EF of 45.      Assessment:     1. Transaminitis     2. Status post insertion of drug eluting coronary artery stent     3. NSTEMI (non-ST elevated myocardial infarction) (HCC)     4. Mixed hyperlipidemia     5. High risk medication use     6. Essential hypertension     7. Coronary artery disease involving native coronary artery of native heart without angina pectoris     8. Acquired hypothyroidism     9. ACC/AHA stage B systolic heart failure (HCC)         Medical Decision Making:  Today's Assessment / Status / Plan:     70-year-old male with CAD status post stent recently placed to the circumflex artery.  We will keep him on dual antiplatelet therapy for 1 year.  He will also stay on the aspirin and Zetia.  He is otherwise doing well.  We will see him back in 6 months to stop his dual antiplatelet  therapy.

## 2020-11-17 DIAGNOSIS — I10 ESSENTIAL HYPERTENSION: ICD-10-CM

## 2020-11-18 RX ORDER — LOSARTAN POTASSIUM 50 MG/1
TABLET ORAL
Qty: 90 TAB | Refills: 2 | Status: SHIPPED | OUTPATIENT
Start: 2020-11-18 | End: 2021-05-04 | Stop reason: SDUPTHER

## 2020-12-07 ENCOUNTER — HOSPITAL ENCOUNTER (OUTPATIENT)
Dept: RADIOLOGY | Facility: MEDICAL CENTER | Age: 70
End: 2020-12-07
Attending: STUDENT IN AN ORGANIZED HEALTH CARE EDUCATION/TRAINING PROGRAM
Payer: MEDICARE

## 2020-12-07 DIAGNOSIS — M54.50 LOW BACK PAIN, UNSPECIFIED BACK PAIN LATERALITY, UNSPECIFIED CHRONICITY, UNSPECIFIED WHETHER SCIATICA PRESENT: ICD-10-CM

## 2020-12-07 PROCEDURE — 72170 X-RAY EXAM OF PELVIS: CPT

## 2020-12-07 PROCEDURE — 72110 X-RAY EXAM L-2 SPINE 4/>VWS: CPT

## 2021-01-05 NOTE — PROGRESS NOTES
Subjective:      Lv Mast is a 70 y.o. male who presents with New Patient (Bilateral hip pain)            AD patient referred to neurology for bilateral anterior thigh pain and intermittent lower extremity weakness.     He has had 1 year of intermittent pain from his hips to his knees bilaterally. The pain can be severe enough to prevent him from standing out of the chair. Straining at the toilet will often trigger the pain. Sitting or standing alone does not worsen the pain. He denies saddle anesthesia.     If the pain is severe enough he is unable to walk without a cane.     Per patient, he just returned from a visit with his orthopedic doctor Dr. Kirk that performed a MRI of the lumbar spine that revealed bulging discs. He has the understanding that I am going to perform spinal analgesic injections.       ROS    Past medical history:   Past Medical History:   Diagnosis Date   • Anxiety    • GERD (gastroesophageal reflux disease)    • Hyperlipidemia    • Hypertension    • Hypothyroidism        Past surgical history:   Past Surgical History:   Procedure Laterality Date   • ZZZ CARDIAC CATH  10/29/2018    Synergy stents to LAD , RCA   • ARTHROSCOPY, KNEE Left 06/1992   • CATARACT EXTRACTION WITH IOL         Family history:   Family History   Problem Relation Age of Onset   • Arthritis Mother    • Diabetes Mother    • Hypertension Mother    • Hyperlipidemia Mother    • Stroke Mother    • Cancer Maternal Grandmother         breast cancer   • Stroke Maternal Grandfather    • Stroke Paternal Grandfather 40        CVA   • Lung Disease Neg Hx    • Genetic Disorder Neg Hx    • Psychiatric Illness Neg Hx    • Heart Disease Neg Hx    • Alcohol/Drug Neg Hx        Social history:   Social History     Socioeconomic History   • Marital status:      Spouse name: Not on file   • Number of children: Not on file   • Years of education: Not on file   • Highest education level: Not on file   Occupational History   •  "Not on file   Social Needs   • Financial resource strain: Not on file   • Food insecurity     Worry: Not on file     Inability: Not on file   • Transportation needs     Medical: Not on file     Non-medical: Not on file   Tobacco Use   • Smoking status: Former Smoker     Types: Cigars     Quit date:      Years since quittin.0   • Smokeless tobacco: Never Used   Substance and Sexual Activity   • Alcohol use: No   • Drug use: No   • Sexual activity: Not on file   Lifestyle   • Physical activity     Days per week: Not on file     Minutes per session: Not on file   • Stress: Not on file   Relationships   • Social connections     Talks on phone: Not on file     Gets together: Not on file     Attends Jain service: Not on file     Active member of club or organization: Not on file     Attends meetings of clubs or organizations: Not on file     Relationship status: Not on file   • Intimate partner violence     Fear of current or ex partner: Not on file     Emotionally abused: Not on file     Physically abused: Not on file     Forced sexual activity: Not on file   Other Topics Concern   • Not on file   Social History Narrative   • Not on file       Current medications:   Current Outpatient Medications   Medication   • losartan (COZAAR) 50 MG Tab   • levothyroxine (SYNTHROID) 75 MCG Tab   • omeprazole (PRILOSEC) 40 MG delayed-release capsule   • nitroglycerin (NITROSTAT) 0.4 MG SL Tab   • carvedilol (COREG) 3.125 MG Tab   • atorvastatin (LIPITOR) 40 MG Tab   • ezetimibe (ZETIA) 10 MG Tab   • glipiZIDE SR (GLUCOTROL) 2.5 MG TABLET SR 24 HR   • aspirin EC (ECOTRIN) 81 MG Tablet Delayed Response   • LORazepam (ATIVAN) 1 MG Tab   • acetaminophen (TYLENOL) 500 MG Tab   • BRILINTA 90 MG Tab tablet   • BOOSTRIX 5-2.5-18.5 LF-MCG/0.5 Suspension     No current facility-administered medications for this visit.        Medication Allergy:  Allergies   Allergen Reactions   • Sulfa Drugs Unspecified     \"does not do well\" per " "pt family           Objective:     /82 (BP Location: Left arm, Patient Position: Sitting, BP Cuff Size: Adult)   Pulse 91   Temp 36.8 °C (98.2 °F) (Temporal)   Resp 14   Ht 1.859 m (6' 1.2\")   Wt 96.3 kg (212 lb 4.9 oz)   SpO2 94%   BMI 27.86 kg/m²      Physical Exam  Constitutional:       Appearance: Normal appearance.   HENT:      Head: Normocephalic and atraumatic.      Right Ear: External ear normal.      Left Ear: External ear normal.      Nose: Nose normal.   Eyes:      General: Lids are normal.      Extraocular Movements: Extraocular movements intact.      Right eye: Normal extraocular motion.      Left eye: Normal extraocular motion.      Conjunctiva/sclera: Conjunctivae normal.   Neck:      Musculoskeletal: Normal range of motion and neck supple.   Pulmonary:      Effort: Pulmonary effort is normal.      Breath sounds: No stridor. No rhonchi.   Abdominal:      General: There is no distension.      Tenderness: There is no guarding.   Musculoskeletal: Normal range of motion.         General: No swelling or tenderness.   Skin:     General: Skin is warm.   Neurological:      Mental Status: He is alert.      Cranial Nerves: Cranial nerves are intact. No cranial nerve deficit or dysarthria.      Sensory: Sensation is intact. No sensory deficit.      Motor: Tremor (Slight right upper extremity postural tremor) present. No weakness or abnormal muscle tone.      Coordination: Coordination is intact. Coordination normal. Finger-Nose-Finger Test normal.      Gait: Gait is intact. Gait normal.      Deep Tendon Reflexes:      Reflex Scores:       Tricep reflexes are 2+ on the right side and 2+ on the left side.       Bicep reflexes are 2+ on the right side and 2+ on the left side.       Brachioradialis reflexes are 2+ on the right side and 2+ on the left side.       Patellar reflexes are 2+ on the right side and 2+ on the left side.       Achilles reflexes are 0 on the right side and 0 on the left side.     " Comments: His hip flexion, abduction, knee extension and ankle dorsi and plantar flexion are 5/5 in the lower extremities.     Sensation in the legs was normal to pinprick.                     Imagin20 MRI LUMBAR SPINE: there is severe central canal stenosis at L3-4 per report.      Assessment/Plan:     Problem List Items Addressed This Visit     None      Visit Diagnoses     Lumbar stenosis with neurogenic claudication              1. Lumbar stenosis with neurogenic claudication     This patient presents to neurology expecting spinal injections for treatment of herniated lumbar discs, however I do not perform this. He states that he had a MRI performed this week that revealed herniated discs in the lumbar spine. By history, he has bilateral lower anterior extremity pain that is triggered by valsalva therefore symptomatic lumbar disc herniation is the most likely diagnosis. His neurologic examination was normal in the office today. I have reviewed the report of his lumbar MRI and it describes severe lumbar stenosis at L3-4. L3 nerve root radiculopathy may result in anterior thigh and knee pain worsened with valsalva as the patient endorses. Note will be forwarded to referring doctor for management.     FOLLOW-UP:   Return if symptoms worsen or fail to improve.    I spent 40 minutes face-to-face in which greater than 50% of the visit was spent in counseling/coordination of care of lumbar stenosis.     Dr. Alexsander Velasquez D.O.  [facility] Neurology   Movement Disorders Specialist

## 2021-01-06 ENCOUNTER — OFFICE VISIT (OUTPATIENT)
Dept: NEUROLOGY | Facility: MEDICAL CENTER | Age: 71
End: 2021-01-06
Attending: PSYCHIATRY & NEUROLOGY
Payer: MEDICARE

## 2021-01-06 VITALS
RESPIRATION RATE: 14 BRPM | SYSTOLIC BLOOD PRESSURE: 122 MMHG | DIASTOLIC BLOOD PRESSURE: 82 MMHG | BODY MASS INDEX: 28.14 KG/M2 | WEIGHT: 212.3 LBS | OXYGEN SATURATION: 94 % | HEIGHT: 73 IN | TEMPERATURE: 98.2 F | HEART RATE: 91 BPM

## 2021-01-06 DIAGNOSIS — M48.062 LUMBAR STENOSIS WITH NEUROGENIC CLAUDICATION: ICD-10-CM

## 2021-01-06 PROCEDURE — 99204 OFFICE O/P NEW MOD 45 MIN: CPT | Performed by: PSYCHIATRY & NEUROLOGY

## 2021-01-06 NOTE — PATIENT INSTRUCTIONS
I will communicate with your orthopedic doctor Dr. Kirk. I will then forward the records of this visit and my communication with Dr. Kirk to your PCP.

## 2021-01-13 ENCOUNTER — TELEPHONE (OUTPATIENT)
Dept: NEUROLOGY | Facility: MEDICAL CENTER | Age: 71
End: 2021-01-13

## 2021-01-13 NOTE — TELEPHONE ENCOUNTER
Following up on a satisfaction survey done by the patient and his wife, concerning his visit with Dr. Velasquez. Patient stated that the visit was not good and that they felt Dr. Velasquez to be very rude and have no compassion for his patient's. They were told that they could not be helped. They left without any directions and no answers. I spoke to patient apologized and entered a midas for their visit. Patient and I will work together to make sure we get them to the right place for his diagnosis. I believe he should of been in Physiatry pain management instead of Neurology. I will forward the referral to Physiatry for advice and help the patient get there. Patient was happy and grateful at the end of the call.

## 2021-03-24 ENCOUNTER — TELEPHONE (OUTPATIENT)
Dept: CARDIOLOGY | Facility: MEDICAL CENTER | Age: 71
End: 2021-03-24

## 2021-03-24 NOTE — TELEPHONE ENCOUNTER
Received stratification request from [facility].     Procedure: EGD and Colonoscopy with Deep Sedation    To RO, Ok to proceed?    Last seen in clinic 11/4/20

## 2021-03-25 NOTE — TELEPHONE ENCOUNTER
David Echols M.D.  You 1 hour ago (7:06 AM)     Yes      Clearance faxed to [phone], transmission complete

## 2021-05-04 ENCOUNTER — OFFICE VISIT (OUTPATIENT)
Dept: CARDIOLOGY | Facility: MEDICAL CENTER | Age: 71
End: 2021-05-04
Payer: MEDICARE

## 2021-05-04 VITALS
BODY MASS INDEX: 28.23 KG/M2 | HEART RATE: 67 BPM | WEIGHT: 213 LBS | OXYGEN SATURATION: 95 % | SYSTOLIC BLOOD PRESSURE: 108 MMHG | HEIGHT: 73 IN | DIASTOLIC BLOOD PRESSURE: 60 MMHG

## 2021-05-04 DIAGNOSIS — Z95.5 STATUS POST INSERTION OF DRUG ELUTING CORONARY ARTERY STENT: ICD-10-CM

## 2021-05-04 DIAGNOSIS — R74.01 TRANSAMINITIS: ICD-10-CM

## 2021-05-04 DIAGNOSIS — I21.4 NSTEMI (NON-ST ELEVATED MYOCARDIAL INFARCTION) (HCC): ICD-10-CM

## 2021-05-04 DIAGNOSIS — E03.9 ACQUIRED HYPOTHYROIDISM: ICD-10-CM

## 2021-05-04 DIAGNOSIS — E78.2 MIXED HYPERLIPIDEMIA: ICD-10-CM

## 2021-05-04 DIAGNOSIS — I50.20 ACC/AHA STAGE B SYSTOLIC HEART FAILURE (HCC): ICD-10-CM

## 2021-05-04 DIAGNOSIS — I10 ESSENTIAL HYPERTENSION: ICD-10-CM

## 2021-05-04 DIAGNOSIS — Z79.899 HIGH RISK MEDICATION USE: ICD-10-CM

## 2021-05-04 DIAGNOSIS — I25.10 CORONARY ARTERY DISEASE INVOLVING NATIVE CORONARY ARTERY OF NATIVE HEART WITHOUT ANGINA PECTORIS: ICD-10-CM

## 2021-05-04 PROCEDURE — 99214 OFFICE O/P EST MOD 30 MIN: CPT | Performed by: INTERNAL MEDICINE

## 2021-05-04 RX ORDER — LOSARTAN POTASSIUM 50 MG/1
50 TABLET ORAL DAILY
Qty: 90 TABLET | Refills: 3 | Status: SHIPPED | OUTPATIENT
Start: 2021-05-04 | End: 2022-05-25

## 2021-05-04 RX ORDER — LOSARTAN POTASSIUM 50 MG/1
50 TABLET ORAL DAILY
Qty: 90 TABLET | Refills: 3 | Status: SHIPPED | OUTPATIENT
Start: 2021-05-04 | End: 2021-05-04 | Stop reason: SDUPTHER

## 2021-05-04 RX ORDER — ATORVASTATIN CALCIUM 40 MG/1
40 TABLET, FILM COATED ORAL EVERY EVENING
Qty: 90 TABLET | Refills: 3 | Status: SHIPPED | OUTPATIENT
Start: 2021-05-04 | End: 2022-08-01

## 2021-05-04 RX ORDER — CARVEDILOL 3.12 MG/1
3.12 TABLET ORAL 2 TIMES DAILY WITH MEALS
Qty: 180 TABLET | Refills: 3 | Status: SHIPPED | OUTPATIENT
Start: 2021-05-04 | End: 2022-08-01

## 2021-05-04 ASSESSMENT — ENCOUNTER SYMPTOMS
BRUISES/BLEEDS EASILY: 0
CLAUDICATION: 0
CARDIOVASCULAR NEGATIVE: 1
RESPIRATORY NEGATIVE: 1
PND: 0
EYES NEGATIVE: 1
MUSCULOSKELETAL NEGATIVE: 1
HEMOPTYSIS: 0
SORE THROAT: 0
LOSS OF CONSCIOUSNESS: 0
FEVER: 0
SHORTNESS OF BREATH: 0
DIZZINESS: 1
PALPITATIONS: 0
CONSTITUTIONAL NEGATIVE: 1
SPUTUM PRODUCTION: 0
CHILLS: 0
WHEEZING: 0
ORTHOPNEA: 0
COUGH: 0
GASTROINTESTINAL NEGATIVE: 1
WEAKNESS: 0
STRIDOR: 0

## 2021-05-04 NOTE — PROGRESS NOTES
Chief Complaint   Patient presents with   • MI (Non ST Segment Elevation MI)   • HTN (Controlled)   • Hyperlipidemia   • Coronary Artery Disease     & Coronary artery disease involving native coronary artery of native heart without angina pectoris   • Congestive Heart Failure     & ACC/AHA stage B systolic heart failure (HCC)       Subjective:   Lv Mast is a 70 y.o. male who presents today as a follow-up for his CAD.  Since he was last seen he was admitted to hospital in [place] with symptoms of chest pain.  Is transferred to [place] because [facility] was on divert.  He had a stent placed in the circumflex.    Since he was last seen he continues to do well.  He stopped his Brilinta.  Is been off atorvastatin 3 months.  He has no chest pain palpitations or PND.  He has not had labs done in some time.      Past Medical History:   Diagnosis Date   • Anxiety    • GERD (gastroesophageal reflux disease)    • Hyperlipidemia    • Hypertension    • Hypothyroidism      Past Surgical History:   Procedure Laterality Date   • ZZZ CARDIAC CATH  10/29/2018    Synergy stents to LAD , RCA   • ARTHROSCOPY, KNEE Left 06/1992   • CATARACT EXTRACTION WITH IOL       Family History   Problem Relation Age of Onset   • Arthritis Mother    • Diabetes Mother    • Hypertension Mother    • Hyperlipidemia Mother    • Stroke Mother    • Cancer Maternal Grandmother         breast cancer   • Stroke Maternal Grandfather    • Stroke Paternal Grandfather 40        CVA   • Lung Disease Neg Hx    • Genetic Disorder Neg Hx    • Psychiatric Illness Neg Hx    • Heart Disease Neg Hx    • Alcohol/Drug Neg Hx      Social History     Socioeconomic History   • Marital status:      Spouse name: Not on file   • Number of children: Not on file   • Years of education: Not on file   • Highest education level: Not on file   Occupational History   • Not on file   Tobacco Use   • Smoking status: Former Smoker     Types: Cigars     Quit date: 2008      "Years since quittin.3   • Smokeless tobacco: Never Used   Substance and Sexual Activity   • Alcohol use: No   • Drug use: No   • Sexual activity: Not on file   Other Topics Concern   • Not on file   Social History Narrative   • Not on file     Social Determinants of Health     Financial Resource Strain:    • Difficulty of Paying Living Expenses:    Food Insecurity:    • Worried About Running Out of Food in the Last Year:    • Ran Out of Food in the Last Year:    Transportation Needs:    • Lack of Transportation (Medical):    • Lack of Transportation (Non-Medical):    Physical Activity:    • Days of Exercise per Week:    • Minutes of Exercise per Session:    Stress:    • Feeling of Stress :    Social Connections:    • Frequency of Communication with Friends and Family:    • Frequency of Social Gatherings with Friends and Family:    • Attends [employer] Services:    • Active Member of Clubs or Organizations:    • Attends Club or Organization Meetings:    • Marital Status:    Intimate Partner Violence:    • Fear of Current or Ex-Partner:    • Emotionally Abused:    • Physically Abused:    • Sexually Abused:      Allergies   Allergen Reactions   • Sulfa Drugs Unspecified     \"does not do well\" per pt family      Outpatient Encounter Medications as of 2021   Medication Sig Dispense Refill   • atorvastatin (LIPITOR) 40 MG Tab Take 1 tablet by mouth every evening. 90 tablet 3   • carvedilol (COREG) 3.125 MG Tab Take 1 tablet by mouth 2 times a day with meals. TWICE A DAY WITH FOOD 180 tablet 3   • losartan (COZAAR) 50 MG Tab Take 1 tablet by mouth every day. 90 tablet 3   • levothyroxine (SYNTHROID) 75 MCG Tab take 1 tablet by mouth every morning AN EMPTY STOMACH     • omeprazole (PRILOSEC) 40 MG delayed-release capsule Take 40 mg by mouth.     • nitroglycerin (NITROSTAT) 0.4 MG SL Tab place 1 tablet under the tongue every 5 minutes if needed for chest pain     • ezetimibe (ZETIA) 10 MG Tab Take 10 mg by mouth.  0 " "  • glipiZIDE SR (GLUCOTROL) 2.5 MG TABLET SR 24 HR   0   • BOOSTRIX 5-2.5-18.5 LF-MCG/0.5 Suspension inject 0.5 milliliter intramuscularly  0   • aspirin EC (ECOTRIN) 81 MG Tablet Delayed Response Take 81 mg by mouth every day.     • LORazepam (ATIVAN) 1 MG Tab Take 1 mg by mouth 2 Times a Day.     • acetaminophen (TYLENOL) 500 MG Tab Take 2,000 mg by mouth every evening as needed (Pain, Sleep).     • [DISCONTINUED] losartan (COZAAR) 50 MG Tab Take 1 tablet by mouth every day. 90 tablet 3   • [DISCONTINUED] losartan (COZAAR) 50 MG Tab take 1 tablet by mouth every evening 90 Tab 2   • [DISCONTINUED] BRILINTA 90 MG Tab tablet TAKE 1 TABLET BY MOUTH TWICE A DAY     • [DISCONTINUED] carvedilol (COREG) 3.125 MG Tab Take 3.125 mg by mouth. TWICE A DAY WITH FOOD     • [DISCONTINUED] atorvastatin (LIPITOR) 40 MG Tab take 1 tablet by mouth daily WITH BIFFEST MEAL       No facility-administered encounter medications on file as of 5/4/2021.     Review of Systems   Constitutional: Negative.  Negative for chills, fever and malaise/fatigue.   HENT: Negative.  Negative for sore throat.    Eyes: Negative.    Respiratory: Negative.  Negative for cough, hemoptysis, sputum production, shortness of breath, wheezing and stridor.    Cardiovascular: Negative.  Negative for chest pain, palpitations, orthopnea, claudication, leg swelling and PND.   Gastrointestinal: Negative.    Genitourinary: Negative.    Musculoskeletal: Negative.    Skin: Negative.    Neurological: Positive for dizziness. Negative for loss of consciousness and weakness.   Endo/Heme/Allergies: Negative.  Does not bruise/bleed easily.   All other systems reviewed and are negative.       Objective:   /60 (BP Location: Right arm, Patient Position: Sitting, BP Cuff Size: Adult)   Pulse 67   Ht 1.854 m (6' 1\")   Wt 96.6 kg (213 lb)   SpO2 95%   BMI 28.10 kg/m²     Physical Exam   Constitutional: He appears well-developed and well-nourished. No distress.   HENT: "   Head: Normocephalic and atraumatic.   Right Ear: External ear normal.   Left Ear: External ear normal.   Nose: Nose normal.   Mouth/Throat: No oropharyngeal exudate.   Eyes: Pupils are equal, round, and reactive to light. Conjunctivae and EOM are normal. Right eye exhibits no discharge. Left eye exhibits no discharge. No scleral icterus.   Neck: No JVD present.   Cardiovascular: Normal rate, regular rhythm and intact distal pulses. Exam reveals no gallop and no friction rub.   No murmur heard.  Pulmonary/Chest: Effort normal. No stridor. No respiratory distress. He has no wheezes. He has no rales. He exhibits no tenderness.   Abdominal: Soft. He exhibits no distension. There is no guarding.   Musculoskeletal:         General: No tenderness, deformity or edema. Normal range of motion.      Cervical back: Neck supple.   Neurological: He is alert. He has normal reflexes. He displays normal reflexes. No cranial nerve deficit. He exhibits normal muscle tone. Coordination normal.   Skin: Skin is warm and dry. No rash noted. He is not diaphoretic. No erythema. No pallor.   Psychiatric: He has a normal mood and affect. His behavior is normal. Judgment and thought content normal.   Nursing note and vitals reviewed.    Outside medical records: Outside medical records from [facility] reviewed showing circumflex stent placed normal echocardiogram normal labs.  Scanned in media.    Echocardiogram: Dated 6/6/2019 personally interpreted myself showing an EF of 70% otherwise normal.    Echocardiogram: Dated 10/29/2018 personally reviewed interpreted myself showing EF of 45.      Assessment:     1. Mixed hyperlipidemia  carvedilol (COREG) 3.125 MG Tab    CBC W/ DIFF W/O PLATELETS    Comp Metabolic Panel    Lipid Profile   2. NSTEMI (non-ST elevated myocardial infarction) (HCC)  atorvastatin (LIPITOR) 40 MG Tab    carvedilol (COREG) 3.125 MG Tab    CBC W/ DIFF W/O PLATELETS    Comp Metabolic Panel    Lipid Profile   3. Status post  insertion of drug eluting coronary artery stent  atorvastatin (LIPITOR) 40 MG Tab    carvedilol (COREG) 3.125 MG Tab    CBC W/ DIFF W/O PLATELETS    Comp Metabolic Panel    Lipid Profile   4. Transaminitis  atorvastatin (LIPITOR) 40 MG Tab    CBC W/ DIFF W/O PLATELETS    Comp Metabolic Panel    Lipid Profile   5. High risk medication use  Comp Metabolic Panel    Lipid Profile   6. Essential hypertension  losartan (COZAAR) 50 MG Tab    DISCONTINUED: losartan (COZAAR) 50 MG Tab   7. Coronary artery disease involving native coronary artery of native heart without angina pectoris     8. ACC/AHA stage B systolic heart failure (HCC)     9. Acquired hypothyroidism         Medical Decision Making:  Today's Assessment / Status / Plan:     70-year-old male with CAD status post stent recently placed to the circumflex artery.  We will keep him on dual antiplatelet therapy for 1 year.  Instructed him to go and stay off the Brilinta.  I will check a number of labs today.  I refilled his medications.  I will see him back in 1 year.

## 2021-05-19 NOTE — ED NOTES
Esau from Lab called with critical result of troponin 35.92 at 2000. Critical lab result read back to Esau.   Dr. Kurtz notified of critical lab result at 2001.  Critical lab result read back by Dr. Kurtz.   Type Of Destruction Used: Curettage

## 2022-05-27 ENCOUNTER — TELEPHONE (OUTPATIENT)
Dept: CARDIOLOGY | Facility: MEDICAL CENTER | Age: 72
End: 2022-05-27
Payer: COMMERCIAL

## 2022-05-27 DIAGNOSIS — E78.2 MIXED HYPERLIPIDEMIA: ICD-10-CM

## 2022-05-27 DIAGNOSIS — Z79.899 HIGH RISK MEDICATION USE: ICD-10-CM

## 2022-05-27 DIAGNOSIS — I10 ESSENTIAL HYPERTENSION: ICD-10-CM

## 2022-05-27 NOTE — TELEPHONE ENCOUNTER
Spoke with PT about scheduling a follow up visit. Wife as calendar and will call back to schedule. Sent Kevon message to send PT lab work orders via mail.   SLC

## 2022-05-27 NOTE — TELEPHONE ENCOUNTER
Augusta Fair R.N.  Morning! Called PT to schedule follow up appt. His wife has the calendar and will call back to schedule, but PT said he will need lab orders before appointment and to please mail them as soon as possible. Mailing address on Epic is up to date. Thanks!   SLC     Annual labs, lipid and cmp, orderd. Placed in outgoing mail.

## 2022-05-27 NOTE — TELEPHONE ENCOUNTER
----- Message from Brynn Lema, Med Ass't sent at 5/25/2022 11:04 AM PDT -----  Regarding: Need Follow Appointment  Please contact patient to schedule follow up appointment. Thank you

## 2022-07-30 DIAGNOSIS — R74.01 TRANSAMINITIS: ICD-10-CM

## 2022-07-30 DIAGNOSIS — E78.2 MIXED HYPERLIPIDEMIA: ICD-10-CM

## 2022-07-30 DIAGNOSIS — Z95.5 STATUS POST INSERTION OF DRUG ELUTING CORONARY ARTERY STENT: ICD-10-CM

## 2022-07-30 DIAGNOSIS — I21.4 NSTEMI (NON-ST ELEVATED MYOCARDIAL INFARCTION) (HCC): ICD-10-CM

## 2022-08-01 RX ORDER — CARVEDILOL 3.12 MG/1
TABLET ORAL
Qty: 180 TABLET | Refills: 0 | Status: SHIPPED | OUTPATIENT
Start: 2022-08-01 | End: 2022-09-08 | Stop reason: SDUPTHER

## 2022-08-01 RX ORDER — ATORVASTATIN CALCIUM 40 MG/1
40 TABLET, FILM COATED ORAL EVERY EVENING
Qty: 90 TABLET | Refills: 0 | Status: SHIPPED | OUTPATIENT
Start: 2022-08-01 | End: 2022-09-08 | Stop reason: SDUPTHER

## 2022-08-01 NOTE — TELEPHONE ENCOUNTER
Is the patient due for a refill? Yes    Was the patient seen the past year? No    Date of last office visit: 5/4/21    Does the patient have an upcoming appointment?  No    Provider to refill:RO    Does the patients insurance require a 100 day supply?  No

## 2022-08-02 NOTE — TELEPHONE ENCOUNTER
Pt said he would like to do labs first- wife does scheduling and was not available. I did message his nurse to get lab orders mailed.   SLC

## 2022-08-22 ENCOUNTER — TELEPHONE (OUTPATIENT)
Dept: CARDIOLOGY | Facility: MEDICAL CENTER | Age: 72
End: 2022-08-22
Payer: COMMERCIAL

## 2022-08-22 NOTE — TELEPHONE ENCOUNTER
IMELDA    Caller: Lv    Where labs will be completed: [facility] [place]    Fax/Phone number for lab: N/A    Upcoming Appointment Date: 09-    Callback Number: [phone]    ** PT IS ASKING NOW ASKING FOR HIS LAB ORDERS FROM IMELDA BE SENT TO HIS EMAIL ADDRESS AS HE'S NOT ABLE TO PRINT OFF [employer].    PT IS ALSO ASKING IF IMELDA WOULD BE ABLE TO DO A VIRTUAL APPT WITH HIM.    EMAIL - [email or web address]

## 2022-08-25 DIAGNOSIS — I10 ESSENTIAL HYPERTENSION: ICD-10-CM

## 2022-08-25 NOTE — TELEPHONE ENCOUNTER
Is the patient due for a refill? Yes    Was the patient seen the past year? No    Date of last office visit: 5/4/21    Does the patient have an upcoming appointment?  No       Provider to refill: RO    Does the patients insurance require a 100 day supply?  No

## 2022-08-26 RX ORDER — LOSARTAN POTASSIUM 50 MG/1
TABLET ORAL
Qty: 30 TABLET | Refills: 0 | Status: SHIPPED | OUTPATIENT
Start: 2022-08-26 | End: 2022-09-08 | Stop reason: SDUPTHER

## 2022-09-08 ENCOUNTER — TELEMEDICINE (OUTPATIENT)
Dept: CARDIOLOGY | Facility: MEDICAL CENTER | Age: 72
End: 2022-09-08
Payer: MEDICARE

## 2022-09-08 VITALS — SYSTOLIC BLOOD PRESSURE: 115 MMHG | HEART RATE: 78 BPM | OXYGEN SATURATION: 95 % | DIASTOLIC BLOOD PRESSURE: 70 MMHG

## 2022-09-08 DIAGNOSIS — R74.01 TRANSAMINITIS: ICD-10-CM

## 2022-09-08 DIAGNOSIS — I25.10 CORONARY ARTERY DISEASE INVOLVING NATIVE CORONARY ARTERY OF NATIVE HEART WITHOUT ANGINA PECTORIS: ICD-10-CM

## 2022-09-08 DIAGNOSIS — I10 ESSENTIAL HYPERTENSION: ICD-10-CM

## 2022-09-08 DIAGNOSIS — Z79.899 HIGH RISK MEDICATION USE: ICD-10-CM

## 2022-09-08 DIAGNOSIS — E78.2 MIXED HYPERLIPIDEMIA: ICD-10-CM

## 2022-09-08 DIAGNOSIS — I50.20 ACC/AHA STAGE B SYSTOLIC HEART FAILURE (HCC): ICD-10-CM

## 2022-09-08 DIAGNOSIS — I21.4 NSTEMI (NON-ST ELEVATED MYOCARDIAL INFARCTION) (HCC): ICD-10-CM

## 2022-09-08 DIAGNOSIS — M48.062 LUMBAR STENOSIS WITH NEUROGENIC CLAUDICATION: ICD-10-CM

## 2022-09-08 DIAGNOSIS — Z95.5 STATUS POST INSERTION OF DRUG ELUTING CORONARY ARTERY STENT: ICD-10-CM

## 2022-09-08 PROCEDURE — 99214 OFFICE O/P EST MOD 30 MIN: CPT | Mod: 95 | Performed by: INTERNAL MEDICINE

## 2022-09-08 RX ORDER — LOSARTAN POTASSIUM 50 MG/1
50 TABLET ORAL DAILY
Qty: 90 TABLET | Refills: 4 | Status: SHIPPED | OUTPATIENT
Start: 2022-09-08

## 2022-09-08 RX ORDER — ATORVASTATIN CALCIUM 40 MG/1
40 TABLET, FILM COATED ORAL EVERY EVENING
Qty: 90 TABLET | Refills: 3 | Status: SHIPPED | OUTPATIENT
Start: 2022-09-08 | End: 2022-11-02

## 2022-09-08 RX ORDER — CARVEDILOL 3.12 MG/1
3.12 TABLET ORAL 2 TIMES DAILY WITH MEALS
Qty: 180 TABLET | Refills: 3 | Status: SHIPPED | OUTPATIENT
Start: 2022-09-08 | End: 2022-11-02

## 2022-09-08 ASSESSMENT — ENCOUNTER SYMPTOMS
WEAKNESS: 0
PND: 0
PALPITATIONS: 0
COUGH: 0
BRUISES/BLEEDS EASILY: 0
DIZZINESS: 0
SPUTUM PRODUCTION: 0
LOSS OF CONSCIOUSNESS: 0
MUSCULOSKELETAL NEGATIVE: 1
HEMOPTYSIS: 0
SHORTNESS OF BREATH: 0
RESPIRATORY NEGATIVE: 1
FEVER: 0
CARDIOVASCULAR NEGATIVE: 1
STRIDOR: 0
CONSTITUTIONAL NEGATIVE: 1
ORTHOPNEA: 0
EYES NEGATIVE: 1
NEUROLOGICAL NEGATIVE: 1
CHILLS: 0
WHEEZING: 0
CLAUDICATION: 0
SORE THROAT: 0
GASTROINTESTINAL NEGATIVE: 1

## 2022-09-08 NOTE — PROGRESS NOTES
Chief Complaint   Patient presents with    Hypertension    MI (Non ST Segment Elevation MI)    Coronary Artery Disease     F/v dx:        Subjective     Lv Mast is a 72 y.o. male who presents today since he was seen he is been on the same medications.  His labs checked that showed an A1c of 6.6.  He has LDL is controlled.  His blood pressure is equally controlled.  He is having no chest pain or shortness of breath.  He is moving to [facility] tomorrow    Past Medical History:   Diagnosis Date    Anxiety     GERD (gastroesophageal reflux disease)     Hyperlipidemia     Hypertension     Hypothyroidism      Past Surgical History:   Procedure Laterality Date    ZZZ CARDIAC CATH  10/29/2018    Synergy stents to LAD , RCA    ARTHROSCOPY, KNEE Left 1992    CATARACT EXTRACTION WITH IOL       Family History   Problem Relation Age of Onset    Arthritis Mother     Diabetes Mother     Hypertension Mother     Hyperlipidemia Mother     Stroke Mother     Cancer Maternal Grandmother         breast cancer    Stroke Maternal Grandfather     Stroke Paternal Grandfather 40        CVA    Lung Disease Neg Hx     Genetic Disorder Neg Hx     Psychiatric Illness Neg Hx     Heart Disease Neg Hx     Alcohol/Drug Neg Hx      Social History     Socioeconomic History    Marital status:      Spouse name: Not on file    Number of children: Not on file    Years of education: Not on file    Highest education level: Not on file   Occupational History    Not on file   Tobacco Use    Smoking status: Former     Types: Cigars     Quit date:      Years since quittin.6    Smokeless tobacco: Never   Substance and Sexual Activity    Alcohol use: No    Drug use: No    Sexual activity: Not on file   Other Topics Concern    Not on file   Social History Narrative    Not on file     Social Determinants of Health     Financial Resource Strain: Not on file   Food Insecurity: Not on file   Transportation Needs: Not on file  "  Physical Activity: Not on file   Stress: Not on file   Social Connections: Not on file   Intimate Partner Violence: Not on file   Housing Stability: Not on file     Allergies   Allergen Reactions    Sulfa Drugs Unspecified     \"does not do well\" per pt family      Outpatient Encounter Medications as of 9/8/2022   Medication Sig Dispense Refill    losartan (COZAAR) 50 MG Tab Take 1 Tablet by mouth every day. 90 Tablet 4    carvedilol (COREG) 3.125 MG Tab Take 1 Tablet by mouth 2 times a day with meals. 180 Tablet 3    atorvastatin (LIPITOR) 40 MG Tab Take 1 Tablet by mouth every evening. 90 Tablet 3    levothyroxine (SYNTHROID) 75 MCG Tab take 1 tablet by mouth every morning AN EMPTY STOMACH      omeprazole (PRILOSEC) 40 MG delayed-release capsule Take 40 mg by mouth 2 times a day.      nitroglycerin (NITROSTAT) 0.4 MG SL Tab place 1 tablet under the tongue every 5 minutes if needed for chest pain      ezetimibe (ZETIA) 10 MG Tab Take 10 mg by mouth.  0    glipiZIDE SR (GLUCOTROL) 2.5 MG TABLET SR 24 HR Take 2.5 mg by mouth every day.  0    BOOSTRIX 5-2.5-18.5 LF-MCG/0.5 Suspension inject 0.5 milliliter intramuscularly  0    aspirin EC (ECOTRIN) 81 MG Tablet Delayed Response Take 81 mg by mouth every day.      LORazepam (ATIVAN) 1 MG Tab Take 1 mg by mouth 2 Times a Day.      acetaminophen (TYLENOL) 500 MG Tab Take 2,000 mg by mouth every evening as needed (Pain, Sleep).      [DISCONTINUED] losartan (COZAAR) 50 MG Tab take 1 tablet by mouth once daily (MAKE APPT FOR FUTURE REFILLS [phone]) 30 Tablet 0    [DISCONTINUED] atorvastatin (LIPITOR) 40 MG Tab take 1 tablet by mouth every evening 90 Tablet 0    [DISCONTINUED] carvedilol (COREG) 3.125 MG Tab take 1 tablet by mouth twice a day with food 180 Tablet 0     No facility-administered encounter medications on file as of 9/8/2022.     Review of Systems   Constitutional: Negative.  Negative for chills, fever and malaise/fatigue.   HENT: Negative.  Negative for " sore throat.    Eyes: Negative.    Respiratory: Negative.  Negative for cough, hemoptysis, sputum production, shortness of breath, wheezing and stridor.    Cardiovascular: Negative.  Negative for chest pain, palpitations, orthopnea, claudication, leg swelling and PND.   Gastrointestinal: Negative.    Genitourinary: Negative.    Musculoskeletal: Negative.    Skin: Negative.    Neurological: Negative.  Negative for dizziness, loss of consciousness and weakness.   Endo/Heme/Allergies: Negative.  Does not bruise/bleed easily.   All other systems reviewed and are negative.           Objective     /70 (BP Location: Left arm, Patient Position: Sitting, BP Cuff Size: Adult)   Pulse 78   SpO2 95%     Physical Exam  Nursing note reviewed.   Constitutional:       General: He is not in acute distress.     Appearance: He is well-developed. He is not diaphoretic.   HENT:      Head: Normocephalic and atraumatic.      Right Ear: External ear normal.      Left Ear: External ear normal.      Nose: Nose normal.      Mouth/Throat:      Pharynx: No oropharyngeal exudate.   Eyes:      General: No scleral icterus.        Right eye: No discharge.         Left eye: No discharge.      Conjunctiva/sclera: Conjunctivae normal.      Pupils: Pupils are equal, round, and reactive to light.   Neck:      Vascular: No JVD.      Trachea: No tracheal deviation.   Cardiovascular:      Rate and Rhythm: Normal rate.   Pulmonary:      Effort: Pulmonary effort is normal. No respiratory distress.      Breath sounds: Normal breath sounds. No stridor.   Abdominal:      General: Bowel sounds are normal.      Palpations: Abdomen is soft.   Musculoskeletal:         General: No tenderness or deformity. Normal range of motion.      Cervical back: Normal range of motion and neck supple.   Skin:     General: Skin is warm and dry.      Coloration: Skin is not pale.      Findings: No erythema or rash.   Neurological:      Mental Status: He is alert and  oriented to person, place, and time.      Cranial Nerves: No cranial nerve deficit.      Coordination: Coordination normal.   Psychiatric:         Behavior: Behavior normal.         Thought Content: Thought content normal.         Judgment: Judgment normal.              Assessment & Plan     1. NSTEMI (non-ST elevated myocardial infarction) (HCC)  carvedilol (COREG) 3.125 MG Tab    atorvastatin (LIPITOR) 40 MG Tab      2. Essential hypertension  losartan (COZAAR) 50 MG Tab      3. Mixed hyperlipidemia  carvedilol (COREG) 3.125 MG Tab      4. Coronary artery disease involving native coronary artery of native heart without angina pectoris        5. Status post insertion of drug eluting coronary artery stent  carvedilol (COREG) 3.125 MG Tab    atorvastatin (LIPITOR) 40 MG Tab      6. ACC/AHA stage B systolic heart failure (HCC)        7. High risk medication use        8. Lumbar stenosis with neurogenic claudication        9. Transaminitis  atorvastatin (LIPITOR) 40 MG Tab          Medical Decision Making: Today's Assessment/Status/Plan:        72-year-old male with CAD status post stent to his circumflex.  I refilled all his medications.  I reviewed his labs from the outside facility which are scanned into media.  I wish him the best of luck.  I refilled his medications for 1 year.  I will see him back as needed.    Start time: 3:15  Stop time: 3:30     This evaluation was conducted via Zoom using secure and encrypted videoconferencing technology. The patient was in a private location outside of their home in the [facility].    The patient's identity was confirmed and verbal consent was obtained for this virtual visit.

## 2022-10-27 DIAGNOSIS — Z95.5 STATUS POST INSERTION OF DRUG ELUTING CORONARY ARTERY STENT: ICD-10-CM

## 2022-10-27 DIAGNOSIS — E78.2 MIXED HYPERLIPIDEMIA: ICD-10-CM

## 2022-10-27 DIAGNOSIS — R74.01 TRANSAMINITIS: ICD-10-CM

## 2022-10-27 DIAGNOSIS — I21.4 NSTEMI (NON-ST ELEVATED MYOCARDIAL INFARCTION) (HCC): ICD-10-CM

## 2022-10-27 NOTE — TELEPHONE ENCOUNTER
Is the patient due for a refill? No    Was the patient seen the past year? Yes    Date of last office visit: 9/8/2022    Does the patient have an upcoming appointment?  No   If yes, When?     Provider to refill:RO    Does the patients insurance require a 100 day supply?  No

## 2022-11-02 RX ORDER — ATORVASTATIN CALCIUM 40 MG/1
40 TABLET, FILM COATED ORAL EVERY EVENING
Qty: 90 TABLET | Refills: 3 | Status: SHIPPED | OUTPATIENT
Start: 2022-11-02 | End: 2023-12-05 | Stop reason: SDUPTHER

## 2022-11-02 RX ORDER — CARVEDILOL 3.12 MG/1
TABLET ORAL
Qty: 180 TABLET | Refills: 3 | Status: SHIPPED | OUTPATIENT
Start: 2022-11-02

## 2023-12-05 DIAGNOSIS — R74.01 TRANSAMINITIS: ICD-10-CM

## 2023-12-05 DIAGNOSIS — Z95.5 STATUS POST INSERTION OF DRUG ELUTING CORONARY ARTERY STENT: ICD-10-CM

## 2023-12-05 DIAGNOSIS — I21.4 NSTEMI (NON-ST ELEVATED MYOCARDIAL INFARCTION) (HCC): ICD-10-CM

## 2023-12-05 RX ORDER — ATORVASTATIN CALCIUM 40 MG/1
40 TABLET, FILM COATED ORAL EVERY EVENING
Qty: 30 TABLET | Refills: 0 | Status: SHIPPED | OUTPATIENT
Start: 2023-12-05

## 2023-12-05 NOTE — TELEPHONE ENCOUNTER
Is the patient due for a refill? Yes    Was the patient seen the past year? No    Date of last office visit: 9/8/22    Does the patient have an upcoming appointment?  No    Provider to refill:EMILY ESTEVEZ    Does the patients insurance require a 100 day supply?  No